# Patient Record
Sex: FEMALE | Race: WHITE | NOT HISPANIC OR LATINO | Employment: UNEMPLOYED | URBAN - NONMETROPOLITAN AREA
[De-identification: names, ages, dates, MRNs, and addresses within clinical notes are randomized per-mention and may not be internally consistent; named-entity substitution may affect disease eponyms.]

---

## 2017-12-01 DIAGNOSIS — I25.118 CORONARY ARTERY DISEASE INVOLVING NATIVE HEART WITH OTHER FORM OF ANGINA PECTORIS, UNSPECIFIED VESSEL OR LESION TYPE (HCC): Primary | ICD-10-CM

## 2017-12-01 PROBLEM — I25.10 CORONARY ARTERY DISEASE: Status: ACTIVE | Noted: 2017-12-01

## 2017-12-08 ENCOUNTER — HOSPITAL ENCOUNTER (OUTPATIENT)
Facility: HOSPITAL | Age: 47
Discharge: HOME | End: 2017-12-09
Attending: INTERNAL MEDICINE | Admitting: INTERNAL MEDICINE

## 2017-12-08 DIAGNOSIS — I25.118 CORONARY ARTERY DISEASE INVOLVING NATIVE HEART WITH OTHER FORM OF ANGINA PECTORIS, UNSPECIFIED VESSEL OR LESION TYPE (HCC): ICD-10-CM

## 2017-12-08 PROCEDURE — C1760 CLOSURE DEV, VASC: HCPCS | Performed by: INTERNAL MEDICINE

## 2017-12-08 PROCEDURE — 93010 ELECTROCARDIOGRAM REPORT: CPT | Performed by: INTERNAL MEDICINE

## 2017-12-08 PROCEDURE — 25010000002 HEPARIN (PORCINE) PER 1000 UNITS: Performed by: INTERNAL MEDICINE

## 2017-12-08 PROCEDURE — C1769 GUIDE WIRE: HCPCS | Performed by: INTERNAL MEDICINE

## 2017-12-08 PROCEDURE — 99153 MOD SED SAME PHYS/QHP EA: CPT | Performed by: INTERNAL MEDICINE

## 2017-12-08 PROCEDURE — 0 IOPAMIDOL PER 1 ML: Performed by: INTERNAL MEDICINE

## 2017-12-08 PROCEDURE — C1887 CATHETER, GUIDING: HCPCS | Performed by: INTERNAL MEDICINE

## 2017-12-08 PROCEDURE — 93571 IV DOP VEL&/PRESS C FLO 1ST: CPT | Performed by: INTERNAL MEDICINE

## 2017-12-08 PROCEDURE — L1830 KO IMMOB CANVAS LONG PRE OTS: HCPCS | Performed by: INTERNAL MEDICINE

## 2017-12-08 PROCEDURE — 93454 CORONARY ARTERY ANGIO S&I: CPT | Performed by: INTERNAL MEDICINE

## 2017-12-08 PROCEDURE — C1874 STENT, COATED/COV W/DEL SYS: HCPCS | Performed by: INTERNAL MEDICINE

## 2017-12-08 PROCEDURE — 99152 MOD SED SAME PHYS/QHP 5/>YRS: CPT | Performed by: INTERNAL MEDICINE

## 2017-12-08 PROCEDURE — 25010000002 BIVALIRUDIN 5 MG/ML: Performed by: INTERNAL MEDICINE

## 2017-12-08 PROCEDURE — 25010000002 DIPHENHYDRAMINE PER 50 MG: Performed by: INTERNAL MEDICINE

## 2017-12-08 PROCEDURE — S0260 H&P FOR SURGERY: HCPCS | Performed by: INTERNAL MEDICINE

## 2017-12-08 PROCEDURE — C9606 PERC D-E COR REVASC W AMI S: HCPCS | Performed by: INTERNAL MEDICINE

## 2017-12-08 PROCEDURE — 25010000002 FENTANYL CITRATE (PF) 100 MCG/2ML SOLUTION: Performed by: INTERNAL MEDICINE

## 2017-12-08 PROCEDURE — 94799 UNLISTED PULMONARY SVC/PX: CPT

## 2017-12-08 PROCEDURE — 25010000002 ADENOSINE (DIAGNOSTIC) PER 30 MG: Performed by: INTERNAL MEDICINE

## 2017-12-08 PROCEDURE — 25010000002 MIDAZOLAM PER 1 MG: Performed by: INTERNAL MEDICINE

## 2017-12-08 PROCEDURE — C1894 INTRO/SHEATH, NON-LASER: HCPCS | Performed by: INTERNAL MEDICINE

## 2017-12-08 PROCEDURE — 93005 ELECTROCARDIOGRAM TRACING: CPT | Performed by: INTERNAL MEDICINE

## 2017-12-08 PROCEDURE — 92928 PRQ TCAT PLMT NTRAC ST 1 LES: CPT | Performed by: INTERNAL MEDICINE

## 2017-12-08 DEVICE — XIENCE ALPINE EVEROLIMUS ELUTING CORONARY STENT SYSTEM 2.50 MM X 15 MM / RAPID-EXCHANGE
Type: IMPLANTABLE DEVICE | Status: FUNCTIONAL
Brand: XIENCE ALPINE

## 2017-12-08 RX ORDER — CLOPIDOGREL BISULFATE 75 MG/1
TABLET ORAL AS NEEDED
Status: DISCONTINUED | OUTPATIENT
Start: 2017-12-08 | End: 2017-12-08 | Stop reason: HOSPADM

## 2017-12-08 RX ORDER — FLUCONAZOLE 150 MG/1
150 TABLET ORAL DAILY PRN
COMMUNITY

## 2017-12-08 RX ORDER — SODIUM CHLORIDE 9 MG/ML
100 INJECTION, SOLUTION INTRAVENOUS CONTINUOUS
Status: DISCONTINUED | OUTPATIENT
Start: 2017-12-08 | End: 2017-12-09 | Stop reason: HOSPADM

## 2017-12-08 RX ORDER — FEXOFENADINE HCL AND PSEUDOEPHEDRINE HCI 180; 240 MG/1; MG/1
1 TABLET, EXTENDED RELEASE ORAL NIGHTLY
COMMUNITY
End: 2017-12-19 | Stop reason: HOSPADM

## 2017-12-08 RX ORDER — FENTANYL CITRATE 50 UG/ML
INJECTION, SOLUTION INTRAMUSCULAR; INTRAVENOUS AS NEEDED
Status: DISCONTINUED | OUTPATIENT
Start: 2017-12-08 | End: 2017-12-08 | Stop reason: HOSPADM

## 2017-12-08 RX ORDER — MIDAZOLAM HYDROCHLORIDE 1 MG/ML
INJECTION INTRAMUSCULAR; INTRAVENOUS AS NEEDED
Status: DISCONTINUED | OUTPATIENT
Start: 2017-12-08 | End: 2017-12-08 | Stop reason: HOSPADM

## 2017-12-08 RX ORDER — POTASSIUM CHLORIDE 750 MG/1
10 CAPSULE, EXTENDED RELEASE ORAL DAILY
Status: DISCONTINUED | OUTPATIENT
Start: 2017-12-08 | End: 2017-12-09 | Stop reason: HOSPADM

## 2017-12-08 RX ORDER — SODIUM CHLORIDE 0.9 % (FLUSH) 0.9 %
1-10 SYRINGE (ML) INJECTION AS NEEDED
Status: DISCONTINUED | OUTPATIENT
Start: 2017-12-08 | End: 2017-12-08 | Stop reason: HOSPADM

## 2017-12-08 RX ORDER — ATORVASTATIN CALCIUM 10 MG/1
20 TABLET, FILM COATED ORAL NIGHTLY
Status: DISCONTINUED | OUTPATIENT
Start: 2017-12-08 | End: 2017-12-09

## 2017-12-08 RX ORDER — LIDOCAINE HYDROCHLORIDE 20 MG/ML
INJECTION, SOLUTION INFILTRATION; PERINEURAL AS NEEDED
Status: DISCONTINUED | OUTPATIENT
Start: 2017-12-08 | End: 2017-12-08 | Stop reason: HOSPADM

## 2017-12-08 RX ORDER — CYCLOBENZAPRINE HCL 10 MG
10 TABLET ORAL 3 TIMES DAILY PRN
COMMUNITY

## 2017-12-08 RX ORDER — ATORVASTATIN CALCIUM 20 MG/1
20 TABLET, FILM COATED ORAL DAILY
COMMUNITY
End: 2017-12-09 | Stop reason: HOSPADM

## 2017-12-08 RX ORDER — CYCLOBENZAPRINE HCL 10 MG
10 TABLET ORAL 3 TIMES DAILY PRN
Status: DISCONTINUED | OUTPATIENT
Start: 2017-12-08 | End: 2017-12-09 | Stop reason: HOSPADM

## 2017-12-08 RX ORDER — AMLODIPINE BESYLATE 2.5 MG/1
2.5 TABLET ORAL NIGHTLY
COMMUNITY

## 2017-12-08 RX ORDER — SODIUM CHLORIDE 9 MG/ML
50 INJECTION, SOLUTION INTRAVENOUS CONTINUOUS
Status: DISCONTINUED | OUTPATIENT
Start: 2017-12-08 | End: 2017-12-09 | Stop reason: HOSPADM

## 2017-12-08 RX ORDER — SODIUM CHLORIDE 9 MG/ML
1-3 INJECTION, SOLUTION INTRAVENOUS CONTINUOUS
Status: DISCONTINUED | OUTPATIENT
Start: 2017-12-08 | End: 2017-12-08

## 2017-12-08 RX ORDER — PREGABALIN 150 MG/1
150 CAPSULE ORAL 3 TIMES DAILY
COMMUNITY

## 2017-12-08 RX ORDER — LISINOPRIL 10 MG/1
10 TABLET ORAL DAILY
COMMUNITY
End: 2017-12-09 | Stop reason: HOSPADM

## 2017-12-08 RX ORDER — CLOPIDOGREL BISULFATE 75 MG/1
75 TABLET ORAL DAILY
Status: DISCONTINUED | OUTPATIENT
Start: 2017-12-09 | End: 2017-12-09 | Stop reason: HOSPADM

## 2017-12-08 RX ORDER — ASPIRIN 81 MG/1
81 TABLET, CHEWABLE ORAL NIGHTLY
COMMUNITY

## 2017-12-08 RX ORDER — GLYCOPYRROLATE 1 MG/1
1 TABLET ORAL 4 TIMES DAILY
COMMUNITY

## 2017-12-08 RX ORDER — ASPIRIN 325 MG
325 TABLET ORAL DAILY
Status: DISCONTINUED | OUTPATIENT
Start: 2017-12-08 | End: 2017-12-08 | Stop reason: HOSPADM

## 2017-12-08 RX ORDER — ASPIRIN 81 MG/1
81 TABLET, CHEWABLE ORAL DAILY
Status: DISCONTINUED | OUTPATIENT
Start: 2017-12-08 | End: 2017-12-09 | Stop reason: HOSPADM

## 2017-12-08 RX ORDER — POTASSIUM CHLORIDE 600 MG/1
20 TABLET, FILM COATED, EXTENDED RELEASE ORAL DAILY PRN
Status: ON HOLD | COMMUNITY
End: 2017-12-17

## 2017-12-08 RX ORDER — CLOPIDOGREL BISULFATE 75 MG/1
75 TABLET ORAL DAILY
COMMUNITY
End: 2017-12-19 | Stop reason: HOSPADM

## 2017-12-08 RX ORDER — ALBUTEROL SULFATE 90 UG/1
2 AEROSOL, METERED RESPIRATORY (INHALATION) EVERY 4 HOURS PRN
COMMUNITY

## 2017-12-08 RX ORDER — PREGABALIN 100 MG/1
150 CAPSULE ORAL 2 TIMES DAILY
Status: ON HOLD | COMMUNITY
End: 2017-12-08

## 2017-12-08 RX ORDER — LORAZEPAM 0.5 MG/1
0.5 TABLET ORAL 2 TIMES DAILY PRN
Status: DISCONTINUED | OUTPATIENT
Start: 2017-12-08 | End: 2017-12-09 | Stop reason: HOSPADM

## 2017-12-08 RX ORDER — LORAZEPAM 0.5 MG/1
0.5 TABLET ORAL 2 TIMES DAILY PRN
COMMUNITY

## 2017-12-08 RX ORDER — DIPHENHYDRAMINE HYDROCHLORIDE 50 MG/ML
INJECTION INTRAMUSCULAR; INTRAVENOUS AS NEEDED
Status: DISCONTINUED | OUTPATIENT
Start: 2017-12-08 | End: 2017-12-08 | Stop reason: HOSPADM

## 2017-12-08 RX ORDER — DICLOFENAC SODIUM AND MISOPROSTOL 75; 200 MG/1; UG/1
1 TABLET, DELAYED RELEASE ORAL 2 TIMES DAILY
Status: ON HOLD | COMMUNITY
End: 2017-12-08

## 2017-12-08 RX ORDER — LISINOPRIL 10 MG/1
10 TABLET ORAL DAILY
Status: DISCONTINUED | OUTPATIENT
Start: 2017-12-08 | End: 2017-12-09

## 2017-12-08 RX ORDER — BUMETANIDE 1 MG/1
1 TABLET ORAL DAILY PRN
COMMUNITY

## 2017-12-08 RX ORDER — GLYCOPYRROLATE 1 MG/1
1 TABLET ORAL 4 TIMES DAILY
Status: DISCONTINUED | OUTPATIENT
Start: 2017-12-08 | End: 2017-12-09 | Stop reason: HOSPADM

## 2017-12-08 RX ORDER — DICLOFENAC SODIUM 75 MG/1
75 TABLET, DELAYED RELEASE ORAL 2 TIMES DAILY
COMMUNITY

## 2017-12-08 RX ORDER — OMEPRAZOLE 20 MG/1
20 CAPSULE, DELAYED RELEASE ORAL DAILY
COMMUNITY

## 2017-12-08 RX ORDER — AMLODIPINE BESYLATE 5 MG/1
2.5 TABLET ORAL DAILY
Status: DISCONTINUED | OUTPATIENT
Start: 2017-12-08 | End: 2017-12-09

## 2017-12-08 RX ORDER — PREGABALIN 75 MG/1
150 CAPSULE ORAL EVERY 12 HOURS SCHEDULED
Status: DISCONTINUED | OUTPATIENT
Start: 2017-12-08 | End: 2017-12-09 | Stop reason: HOSPADM

## 2017-12-08 RX ADMIN — SODIUM CHLORIDE 50 ML/HR: 9 INJECTION, SOLUTION INTRAVENOUS at 13:24

## 2017-12-08 RX ADMIN — ASPIRIN 325 MG: 325 TABLET, COATED ORAL at 13:24

## 2017-12-08 RX ADMIN — GLYCOPYRROLATE 1 MG: 1 TABLET ORAL at 17:47

## 2017-12-08 RX ADMIN — LISINOPRIL 10 MG: 10 TABLET ORAL at 17:47

## 2017-12-08 RX ADMIN — AMLODIPINE BESYLATE 2.5 MG: 5 TABLET ORAL at 17:48

## 2017-12-08 RX ADMIN — ATORVASTATIN CALCIUM 20 MG: 10 TABLET, FILM COATED ORAL at 20:12

## 2017-12-08 RX ADMIN — PREGABALIN 150 MG: 75 CAPSULE ORAL at 20:12

## 2017-12-08 RX ADMIN — GLYCOPYRROLATE 1 MG: 1 TABLET ORAL at 20:12

## 2017-12-08 NOTE — PLAN OF CARE
Problem: Patient Care Overview (Adult)  Goal: Plan of Care Review  Outcome: Ongoing (interventions implemented as appropriate)    12/08/17 1702   Coping/Psychosocial Response Interventions   Plan Of Care Reviewed With patient   Patient Care Overview   Progress no change   Outcome Evaluation   Outcome Summary/Follow up Plan Pt has just arrived to floor. Pt wants to get up and was educated about the need for an 8 hour bedrest per MD orders. Pt states she is agreeable. Will place patient on bedcheck to ensure compliance. R groin CDI. Soft, nontender. Pulses palpable. WIll continune to monitor.        Goal: Adult Individualization and Mutuality  Outcome: Ongoing (interventions implemented as appropriate)    12/08/17 1702   Mutuality/Individual Preferences   What Questions Do You Have About Your Health or Care? Why can't I sit up?   Individualization   Patient Specific Goals Wants something to eat   Patient Specific Interventions Laying flat, knee immobilizer on       Goal: Discharge Needs Assessment  Outcome: Ongoing (interventions implemented as appropriate)    12/08/17 1658 12/08/17 1702   Discharge Needs Assessment   Concerns To Be Addressed --  denies needs/concerns at this time   Readmission Within The Last 30 Days --  no previous admission in last 30 days   Equipment Needed After Discharge --  none   Discharge Disposition --  still a patient   Current Health   Anticipated Changes Related to Illness --  none   Living Environment   Transportation Available --  car;family or friend will provide   Self-Care   Equipment Currently Used at Home bipap/ cpap  (has cpap at home but pt states she does not use it) --          Problem: Cardiac Catheterization with/without PCI (Adult)  Goal: Signs and Symptoms of Listed Potential Problems Will be Absent or Manageable (Cardiac Catheterization with/without PCI)  Outcome: Ongoing (interventions implemented as appropriate)    12/08/17 1702   Cardiac Catheterization with/without PCI    Problems Assessed (Cardiac Catheterization) all   Problems Present (Cardiac Catheterization) none

## 2017-12-08 NOTE — H&P
LOS: 0 days   Patient Care Team:  Patrick Redd MD as PCP - General  John E Broadbent, MD as Referring Physician (Cardiology)  Clinton Reddy MD as Cardiologist (Cardiology)    Chief Complaint: chest pain and dyspnea on exertion      Chest pain with exertion, moderate substernal, pressure like. Lasts less than 5 minutes  No diaphoresis  No nausea  No radiation  Precipitated with exertion  Moderate associated dyspnea    Moderate exertional shortness of breath on exertion relieved with rest  No significant cough or wheezing  Going on for several months  No palpitations  associated chest pain  No significant pedal edema  No fever or chills  No significant expectoration  No hemoptysis  No presyncope or syncope       Patient Complaints:   Denies chest pain currently. Denies excessive shortness of breath. Denies abdominal pain, nausea vomiting or diarrhoea.    Telemetry: no malignant arrhythmia. No significant pauses.     Review of Systems   Constitutional: Negative for chills, fatigue and fever.   HENT: Negative.    Eyes: Negative.    Respiratory: Negative for cough, chest wall soreness  Moderate shortness of breath,   No wheezing  No stridor.    Cardiovascular:Positive for chest pain,   No palpitations and leg swelling.   Gastrointestinal: Negative for abdominal distention,   Mild abdominal pain,   No blood in stool, constipation, diarrhea, nausea and vomiting.   Endocrine: Negative.    Genitourinary: Negative for difficulty urinating, dysuria, flank pain and hematuria.   Musculoskeletal: Negative.    Skin: Negative for rash and wound.   Allergic/Immunologic: Negative.    Neurological: Negative for dizziness, syncope, weakness, light-headedness and headaches.   Hematological: Does not bruise/bleed easily.   Psychiatric/Behavioral: Negative for agitation, behavioral problems, confusion  The patient is not nervous/anxious.     Labs:    WBC No results found for: WBC   HGB No results found for: HGB   HCT No results  "found for: HCT   Platlets No results found for: PLT   MCV No results found for: MCV         Invalid input(s): LABALBU, PROTNo results found for: CKTOTAL, CKMB, CKMBINDEX, TROPONINI, TROPONINT  PT/INR:  No results found for: PROTIME/No results found for: INR    Imaging Results (last 72 hours)     ** No results found for the last 72 hours. **          Objective     Medication Review:   Current Facility-Administered Medications   Medication Dose Route Frequency Provider Last Rate Last Dose   • aspirin tablet 325 mg  325 mg Oral Daily Clinton Reddy MD   325 mg at 12/08/17 1324   • sodium chloride 0.9 % flush 1-10 mL  1-10 mL Intravenous PRN Clinton Reddy MD       • sodium chloride 0.9 % infusion  50 mL/hr Intravenous Continuous Clinton Reddy MD 50 mL/hr at 12/08/17 1324 50 mL/hr at 12/08/17 1324       Vital Sign Min/Max for last 24 hours  Temp  Min: 98.6 °F (37 °C)  Max: 98.6 °F (37 °C)   No Data Recorded   Pulse  Min: 89  Max: 89   Resp  Min: 18  Max: 18   SpO2  Min: 99 %  Max: 99 %   No Data Recorded   Weight  Min: 139 kg (307 lb)  Max: 139 kg (307 lb)     Flowsheet Rows         First Filed Value    Admission Height  165 cm (64.96\") Documented at 12/08/2017 1258    Admission Weight  (!)  139 kg (307 lb) Documented at 12/08/2017 1258                   Physical Exam:  Pupils equal and reactive    Ears ears appear intact with no abnormalities noted  Nose nares normal, septum midline, mucosa normal and no drainage  Neck supple, trachea midline, no thyromegaly, no carotid bruit and no JVD  Back no kyphosis present, no scoliosis present, no skin lesions, erythema, or scars, no tenderness to percussion or palpation and range of motion normal  Lungs respirations regular, respirations even and respirations unlabored  Heart normal S1, S2, no murmur, no francesco, no rub and no click  Abdomen soft  Skin no bleeding, bruising or rash     Results Review:   I reviewed the patient's new clinical results.  I reviewed the patient's new " imaging results and agree with the interpretation.  I reviewed the patient's other test results and agree with the interpretation  I personally viewed and interpreted the patient's EKG/Telemetry data  Discussed with patient    Social History     Social History   • Marital status: Single     Spouse name: N/A   • Number of children: N/A   • Years of education: N/A     Occupational History   • Not on file.     Social History Main Topics   • Smoking status: Current Every Day Smoker     Packs/day: 1.00     Types: Cigarettes   • Smokeless tobacco: Not on file   • Alcohol use No   • Drug use: Not on file   • Sexual activity: Defer     Other Topics Concern   • Not on file     Social History Narrative   • No narrative on file       History reviewed. No pertinent family history.    Allergies   Allergen Reactions   • Ciprofloxacin Nausea And Vomiting   • Hydrocodone Nausea Only   • Metoprolol Itching   • Quinolones Nausea And Vomiting   • Sulfa Antibiotics Nausea And Vomiting       Past Medical History:   Diagnosis Date   • Abnormal Pap smear of cervix    • Anxiety    • Arthritis    • Asthma    • CHF (congestive heart failure)    • Chlamydia    • COPD (chronic obstructive pulmonary disease)    • Epilepsy    • MRSA (methicillin resistant staph aureus) culture positive    • Seasonal allergies    • Sleep apnea    • Stroke    • Wrist fracture        Past Surgical History:   Procedure Laterality Date   • CARDIAC CATHETERIZATION     • CAROTID ENDARTERECTOMY           Medication Review: Performed    Assessment/Plan     Principal Problem:    Coronary artery disease  Stenosis of Left circumflex artery  Essential Hypertension  Morbid obesity  Smoker  obstructive sleep apnea   Prior use coccaine     Plan  Recommend cardiac catheterization, selective coronary angiography, left ventriculography and percutaneous coronary intervention with application of arteriotomy hemostatic closure device.  I discussed cardiac catheterization, the  procedure, risks (including bleeding, infection, vascular damage [including minor oozing, bruising, bleeding, and up to and including but not limited to the need for vascular surgery, emergency cardiothoracic surgery, contrast reaction, renal failure, respiratory failure, heart attack, stroke, arrhythmia and even death), benefits, and alternatives and the patient has voiced understanding and is willing to proceed.  No contraindication to drug eluting stent placement if required  Further recommendations pending results of cardiac catheterization    Supportive care  Telemetry  Optimal medical therapy  Deep vein thrombosis prophylaxis/precautions  Appropriate diet, fluid, sodium, caffeine, stimulants intake   Compliance to diet and medications   Avoid NSAIDS    Clinton Reddy MD  12/08/17  2:06 PM

## 2017-12-09 VITALS
OXYGEN SATURATION: 100 % | RESPIRATION RATE: 18 BRPM | SYSTOLIC BLOOD PRESSURE: 119 MMHG | DIASTOLIC BLOOD PRESSURE: 64 MMHG | BODY MASS INDEX: 48.82 KG/M2 | WEIGHT: 293 LBS | HEART RATE: 87 BPM | HEIGHT: 65 IN | TEMPERATURE: 97.6 F

## 2017-12-09 PROBLEM — G47.33 OSA (OBSTRUCTIVE SLEEP APNEA): Status: ACTIVE | Noted: 2017-12-09

## 2017-12-09 PROBLEM — Z72.0 TOBACCO ABUSE: Status: ACTIVE | Noted: 2017-12-09

## 2017-12-09 PROBLEM — I10 ESSENTIAL HYPERTENSION: Status: ACTIVE | Noted: 2017-12-09

## 2017-12-09 PROBLEM — E78.2 MIXED HYPERLIPIDEMIA: Status: ACTIVE | Noted: 2017-12-09

## 2017-12-09 PROBLEM — I25.10 CORONARY ARTERY DISEASE: Status: ACTIVE | Noted: 2017-12-01

## 2017-12-09 PROBLEM — Z95.5 STATUS POST INSERTION OF DRUG ELUTING CORONARY ARTERY STENT: Status: ACTIVE | Noted: 2017-12-09

## 2017-12-09 PROBLEM — IMO0001 CLASS 3 OBESITY DUE TO EXCESS CALORIES WITH SERIOUS COMORBIDITY AND BODY MASS INDEX (BMI) OF 50.0 TO 59.9 IN ADULT: Status: ACTIVE | Noted: 2017-12-09

## 2017-12-09 LAB
ANION GAP SERPL CALCULATED.3IONS-SCNC: 9 MMOL/L (ref 4–13)
ARTICHOKE IGE QN: 125 MG/DL (ref 0–99)
BUN BLD-MCNC: 10 MG/DL (ref 5–21)
BUN/CREAT SERPL: 12.7 (ref 7–25)
CALCIUM SPEC-SCNC: 8.2 MG/DL (ref 8.4–10.4)
CHLORIDE SERPL-SCNC: 106 MMOL/L (ref 98–110)
CHOLEST SERPL-MCNC: 176 MG/DL (ref 130–200)
CO2 SERPL-SCNC: 24 MMOL/L (ref 24–31)
CREAT BLD-MCNC: 0.79 MG/DL (ref 0.5–1.4)
DEPRECATED RDW RBC AUTO: 44.7 FL (ref 40–54)
ERYTHROCYTE [DISTWIDTH] IN BLOOD BY AUTOMATED COUNT: 13.8 % (ref 12–15)
GFR SERPL CREATININE-BSD FRML MDRD: 78 ML/MIN/1.73
GLUCOSE BLD-MCNC: 120 MG/DL (ref 70–100)
HBA1C MFR BLD: 6.1 %
HCT VFR BLD AUTO: 38.4 % (ref 37–47)
HDLC SERPL-MCNC: 31 MG/DL
HGB BLD-MCNC: 12.2 G/DL (ref 12–16)
LDLC/HDLC SERPL: 3.12 {RATIO}
MCH RBC QN AUTO: 28 PG (ref 28–32)
MCHC RBC AUTO-ENTMCNC: 31.8 G/DL (ref 33–36)
MCV RBC AUTO: 88.3 FL (ref 82–98)
PLATELET # BLD AUTO: 520 10*3/MM3 (ref 130–400)
PMV BLD AUTO: 9.5 FL (ref 6–12)
POTASSIUM BLD-SCNC: 4.2 MMOL/L (ref 3.5–5.3)
RBC # BLD AUTO: 4.35 10*6/MM3 (ref 4.2–5.4)
SODIUM BLD-SCNC: 139 MMOL/L (ref 135–145)
TRIGL SERPL-MCNC: 241 MG/DL (ref 0–149)
WBC NRBC COR # BLD: 11.43 10*3/MM3 (ref 4.8–10.8)

## 2017-12-09 PROCEDURE — 93010 ELECTROCARDIOGRAM REPORT: CPT | Performed by: INTERNAL MEDICINE

## 2017-12-09 PROCEDURE — 80061 LIPID PANEL: CPT | Performed by: INTERNAL MEDICINE

## 2017-12-09 PROCEDURE — 99217 PR OBSERVATION CARE DISCHARGE MANAGEMENT: CPT | Performed by: INTERNAL MEDICINE

## 2017-12-09 PROCEDURE — 85027 COMPLETE CBC AUTOMATED: CPT | Performed by: INTERNAL MEDICINE

## 2017-12-09 PROCEDURE — 83036 HEMOGLOBIN GLYCOSYLATED A1C: CPT | Performed by: INTERNAL MEDICINE

## 2017-12-09 PROCEDURE — 93005 ELECTROCARDIOGRAM TRACING: CPT | Performed by: INTERNAL MEDICINE

## 2017-12-09 PROCEDURE — 80048 BASIC METABOLIC PNL TOTAL CA: CPT | Performed by: INTERNAL MEDICINE

## 2017-12-09 RX ORDER — CARVEDILOL 3.12 MG/1
3.12 TABLET ORAL EVERY 12 HOURS SCHEDULED
Status: DISCONTINUED | OUTPATIENT
Start: 2017-12-09 | End: 2017-12-09 | Stop reason: HOSPADM

## 2017-12-09 RX ORDER — AMLODIPINE BESYLATE 5 MG/1
5 TABLET ORAL DAILY
Status: DISCONTINUED | OUTPATIENT
Start: 2017-12-10 | End: 2017-12-09 | Stop reason: HOSPADM

## 2017-12-09 RX ORDER — LISINOPRIL 20 MG/1
20 TABLET ORAL DAILY
Status: DISCONTINUED | OUTPATIENT
Start: 2017-12-10 | End: 2017-12-09 | Stop reason: HOSPADM

## 2017-12-09 RX ORDER — CARVEDILOL 3.12 MG/1
3.12 TABLET ORAL EVERY 12 HOURS SCHEDULED
Qty: 180 TABLET | Refills: 3 | Status: SHIPPED | OUTPATIENT
Start: 2017-12-09

## 2017-12-09 RX ORDER — LISINOPRIL 20 MG/1
20 TABLET ORAL DAILY
Qty: 90 TABLET | Refills: 3 | Status: SHIPPED | OUTPATIENT
Start: 2017-12-10

## 2017-12-09 RX ORDER — AMLODIPINE BESYLATE 5 MG/1
2.5 TABLET ORAL ONCE
Status: COMPLETED | OUTPATIENT
Start: 2017-12-09 | End: 2017-12-09

## 2017-12-09 RX ORDER — ATORVASTATIN CALCIUM 80 MG/1
80 TABLET, FILM COATED ORAL NIGHTLY
Qty: 90 TABLET | Refills: 3 | Status: SHIPPED | OUTPATIENT
Start: 2017-12-09

## 2017-12-09 RX ORDER — ATORVASTATIN CALCIUM 40 MG/1
80 TABLET, FILM COATED ORAL NIGHTLY
Status: DISCONTINUED | OUTPATIENT
Start: 2017-12-09 | End: 2017-12-09 | Stop reason: HOSPADM

## 2017-12-09 RX ORDER — LISINOPRIL 10 MG/1
10 TABLET ORAL ONCE
Status: COMPLETED | OUTPATIENT
Start: 2017-12-09 | End: 2017-12-09

## 2017-12-09 RX ADMIN — AMLODIPINE BESYLATE 2.5 MG: 5 TABLET ORAL at 08:30

## 2017-12-09 RX ADMIN — LISINOPRIL 10 MG: 10 TABLET ORAL at 12:27

## 2017-12-09 RX ADMIN — AMLODIPINE BESYLATE 2.5 MG: 5 TABLET ORAL at 12:27

## 2017-12-09 RX ADMIN — PREGABALIN 150 MG: 75 CAPSULE ORAL at 08:31

## 2017-12-09 RX ADMIN — LISINOPRIL 10 MG: 10 TABLET ORAL at 08:30

## 2017-12-09 RX ADMIN — GLYCOPYRROLATE 1 MG: 1 TABLET ORAL at 08:31

## 2017-12-09 RX ADMIN — ASPIRIN 81 MG 81 MG: 81 TABLET ORAL at 08:30

## 2017-12-09 RX ADMIN — POTASSIUM CHLORIDE 10 MEQ: 750 CAPSULE, EXTENDED RELEASE ORAL at 08:31

## 2017-12-09 RX ADMIN — CARVEDILOL 3.12 MG: 3.12 TABLET, FILM COATED ORAL at 14:35

## 2017-12-09 NOTE — DISCHARGE SUMMARY
[facility] HEART GROUP DISCHARGE    Date of Admission: 2017  Date of Discharge:  2017    Attending: Dr. Clinton Reddy    Discharge Diagnosis:   Principal Problem:    Coronary artery disease  Active Problems:    Status post insertion of drug eluting coronary artery stent    Mixed hyperlipidemia    Essential hypertension    TUSHAR (obstructive sleep apnea)    Class 3 obesity due to excess calories with serious comorbidity and body mass index (BMI) of 50.0 to 59.9 in adult    Tobacco abuse      Presenting Problem/History of Present Illness  Coronary artery disease involving native heart with other form of angina pectoris, unspecified vessel or lesion type [I25.118]    Hospital Course  Patient is a 47 y.o. female who presented to [facility] for an elective left heart catheterization, which revealed 70% stenosis of mid to distal left circumflex coronary artery treated with the primary stenting using a 2.5×15 mm Alpine drug eluting stent with 0% residual stenosis. The procedure was tolerated well without obvious complications. Overnight telemetry revealed PVCs with couplets and trigeminy. , triglycerides 241. Lipitor has been increased to 80 mg by mouth daily and lisinopril has been increased to 20 mg daily. She has been started on coreg 3.125 mg by mouth twice daily. She will continue on all other medications as previously prescribed including aspirin and plavix. She will follow up with her PCP in two weeks. She will follow up with Dr. Broadbent in 6 weeks. She will be referred to cardiac rehab to start in two weeks.     Procedures Performed  Procedure(s):  Coronary angiography  Stent NAYAN coronary  Functional Flow Reserve     Pertinent Test Results:     [facility] CATH LAB  [address] [place] [id number] [phone]             Patient Information   Patient Name MRN Sex  (Age)   Serene Cr [id number] Female 1970 (47 y.o.)   Race Ethnicity Encounter  Category   White or  Not  or  Elective   Procedures   Coronary angiography   Stent NAYAN coronary   Functional Flow Reserve    Performed Date   Dec 8, 2017      Physicians   Panel Physicians Referring Physician Case Authorizing Physician   Clinton Reddy MD (Primary)  Clinton Reddy MD   Indications   Coronary artery disease involving native heart with other form of angina pectoris, unspecified vessel or lesion type [I25.118 (ICD-10-CM)]       Conclusion   Cardiac Catheterization Operative Report     Serene Cr  [id number]  12/8/2017     Patient was referred for cardiac catheterization       Indications for the procedure include: Symptoms suggestive of angina with high suspicion for significant obstructive coronary artery disease    abnormal stress test  Cardiac catheterization performed by Dr. Broadbent in [facility] shows a severe stenosis of the mid to distal left circumflex coronary artery which is a codominant.  In addition there is stenosis of the right coronary artery.     Procedure performed  Primary stenting of the mid to distal left circumflex coronary artery using a 2.5×15 mm Alpine drug-eluting stent initial stenosis 70% postprocedure 0% EMMY-3 flow before and after procedure.  Fractional flow reserve of right coronary artery.    Coronary angiography  Right femoral arteriography  Insertion of 7 Fr Mynx hemostatic closure device with effective hemostasis and preserved right lower extremity pulses  Supervision of the administration of moderate sedation  Fractional flow reserve of right coronary artery.  Anticoagulation Angiomax bolus and infusion     Procedure Details  The risks, benefits, complications, treatment options, and expected outcomes were discussed with the patient. The patient and/or family concurred with the proposed plan, giving informed consent. Patient was brought to the cath lab after IV hydration was begun and oral premedication was given. He was further sedated  with fentanyl and midazolam.The patient was prepped and draped in the usual manner. Using the modified Seldinger access technique, a 6f Ugandan sheath was placed in the femoral artery.     Fractional flow reserve normal at above 0.95 of the right coronary artery.  Guide used AR 1 with sideholes  Usual protocol was followed.  Adenosine was used to the or hyperemia.  Guide used as mentioned above.  The guide was advanced and the ostium of the vessel was engaged.  We zeroed the status post aortic pressure then we advanced a fractional flow reserve wire.  This was equalized prior to crossing the lesion.  Then we crossed the lesion.  Anticoagulation was used prior to insertion of the wire.  After crossing the lesion adenosine was used.  This caused hyperemia.  Fractional flow reserve was then performed and documented as noted.  End of case the wire was removed removed angiography was performed before and after removing the wire to document no complication and decide results were achieved.      A left heart catheterization was performed.     Angiograms were also obtained.  Cardiac Catheterization Operative Report        Patient was referred for cardiac catheterization . Indications for the procedure include: abnormal stress test, chest pain, shortness of breath.      Procedure Details  The risks, benefits, complications, treatment options, and expected outcomes were discussed with the patient. The patient and/or family concurred with the proposed plan, giving informed consent. Patient was brought to the cath lab after IV hydration was begun and oral premedication was given.      The skin overlying the patient's right femoral artery was prepped and draped in the usual sterile fashion.  Timeout was taken to confirm the correct patient and procedure.  Lidocaine was administered for local anesthesia.  IV Versed and fentanyl were used to achieve conscious sedation.  Modified Seldinger technique was then used to place a 6 Ugandan  sheath in the right femoral artery     Diagnostic coronary angiography and intervention was performed with 7 Tajik XB 3.5 and AR1 coronary catheters.  Coronary angiogram were performed in Greek and HENDRIX projection to evaluate the coronary arterial systems.           Before all coronary angiograms and LV gram and Left heart pressure measurements were obtained, a femoral angiogram was performed and the arteriotomy was suitable for a closure device.  A 7Fr Mynx closure device was used to achieve hemostasis.  The patient tolerated the procedure well, and there were no immediate complications.     Procedural Details: After written and informed consent was obtained, the patient was brought to the cath lab in a fasting state.  Results:      Selective coronary angiography:     Left main coronary artery:  The left main coronary artery arises from the left coronary cusp and bifurcates into the  left anterior descending coronary artery  and left circumflex arteries.       Left main coronary artery is normal     Left anterior descending artery:  The  left anterior descending coronary artery   arises normally from the left main coronary artery and courses in the anterior interventricular groove and terminates at the apex.  No high-grade stenosis noted.     Left circumflex:  The left circumflex arises form the left man artery and supplies obtuse marginal branches mid to distal left circumflex coronary artery has a 70% stenosis this was treated with a 2.5×15 mm Alpine drug-eluting stent with 0% residual stenosis EMMY-3 flow before and after procedure.     Right coronary artery:  The RCA arises normally from the right coronary cusp and is dominant for the posterior circulation.  The RCA is codominant and  proximal to midportion has approximately 60% stenosis.  Fractional flow reserve was performed this is normal at above 0.95.      Left heart cath: Not performed      LV Gram not performed      Interventions: As described  "     Estimated Blood Loss:  Minimal      Complications:  None; patient tolerated the procedure well.      Disposition: Cardiovascular observation unit         Condition: stable               I supervised the administration of conscious sedation by nursing staff throughout the case.       First dose was given at          1424           and the end of my face-to-face encounter was at         1525          Hours.          During the case, continuous pulse oximetry, heart rate, blood pressure, and patient status were monitored.               Conclusion     70% stenosis of mid to distal left circumflex coronary artery treated with the primary stenting using a 2.5×15 mm Alpine drug eluting stent with 0% residual stenosis.  60% stenosis of proximal to mid right coronary artery with normal fractional flow reserve.          Condition on Discharge:  Stable    Physical Exam at Discharge  Patient Vitals for the past 24 hrs:   BP Temp Temp src Pulse Resp SpO2 Height   12/09/17 1300 119/64 - - 87 - 100 % -   12/09/17 0955 151/95 - - 89 - 97 % -   12/09/17 0700 131/76 97.6 °F (36.4 °C) Temporal Art 90 18 95 % -   12/08/17 1925 138/58 97.9 °F (36.6 °C) Tympanic 84 16 94 % -   12/08/17 1712 143/58 97.6 °F (36.4 °C) Temporal Art 83 18 96 % 165.1 cm (65\")   12/08/17 1630 154/84 - - 89 16 96 % -   12/08/17 1615 150/67 - - 84 16 97 % -   12/08/17 1600 128/76 - - 87 20 97 % -   12/08/17 1545 127/71 - - 84 18 98 % -     Telemetry: SR 75-92, PVCs, couplets and trigeminy    Physical Exam   Constitutional: She is oriented to person, place, and time. Vital signs are normal. She appears well-developed and well-nourished. No distress.   HENT:   Head: Normocephalic and atraumatic.   Right Ear: External ear normal.   Left Ear: External ear normal.   Nose: Nose normal.   Eyes: Conjunctivae are normal. Pupils are equal, round, and reactive to light. Right eye exhibits no discharge. Left eye exhibits no discharge.   Neck: Normal range of motion. " Neck supple. No JVD present. Carotid bruit is not present. No tracheal deviation present. No thyromegaly present.   Cardiovascular: Normal rate, regular rhythm, normal heart sounds, intact distal pulses and normal pulses.  PMI is not displaced.  Exam reveals no gallop and no friction rub.    No murmur heard.  Pulses:       Radial pulses are 2+ on the right side, and 2+ on the left side.        Dorsalis pedis pulses are 2+ on the right side, and 2+ on the left side.        Posterior tibial pulses are 2+ on the right side, and 2+ on the left side.   Pulmonary/Chest: Effort normal and breath sounds normal. No respiratory distress. She has no decreased breath sounds. She has no wheezes. She has no rhonchi. She has no rales. She exhibits no tenderness.   Abdominal: Soft. She exhibits no distension. There is no tenderness.   Musculoskeletal: Normal range of motion. She exhibits no edema, tenderness or deformity.   Neurological: She is alert and oriented to person, place, and time.   Skin: Skin is warm and dry. No rash noted. She is not diaphoretic. No erythema. No pallor.        Psychiatric: She has a normal mood and affect. Her behavior is normal. Judgment and thought content normal.   Vitals reviewed.      Discharge Disposition: Home      Discharge Medications   Serene Cr   Home Medication Instructions FABRIZIO:[id number]    Printed on:12/09/17 1430   Medication Information                      albuterol (PROVENTIL HFA;VENTOLIN HFA) 108 (90 Base) MCG/ACT inhaler  Inhale 2 puffs Every 4 (Four) Hours As Needed for Wheezing.             amLODIPine (NORVASC) 2.5 MG tablet  Take 2.5 mg by mouth Daily.             aspirin 81 MG chewable tablet  Chew 81 mg Daily.             atorvastatin (LIPITOR) 80 MG tablet  Take 1 tablet by mouth Every Night.             bumetanide (BUMEX) 1 MG tablet  Take 1 mg by mouth Daily As Needed (swelling).             carvedilol (COREG) 3.125 MG tablet  Take 1 tablet by mouth Every 12 (Twelve)  Hours.             clopidogrel (PLAVIX) 75 MG tablet  Take 75 mg by mouth Daily.             cyclobenzaprine (FLEXERIL) 10 MG tablet  Take 10 mg by mouth 3 (Three) Times a Day As Needed for Muscle Spasms.             diclofenac (VOLTAREN) 75 MG EC tablet  Take 75 mg by mouth 2 (Two) Times a Day.             fexofenadine-pseudoephedrine (ALLEGRA-D 24) 180-240 MG per 24 hr tablet  Take 1 tablet by mouth Daily.             fluconazole (DIFLUCAN) 150 MG tablet  Take 150 mg by mouth Daily.             glycopyrrolate (ROBINUL) 1 MG tablet  Take 1 mg by mouth 4 (Four) Times a Day.             lisinopril (PRINIVIL,ZESTRIL) 20 MG tablet  Take 1 tablet by mouth Daily.             LORazepam (ATIVAN) 0.5 MG tablet  Take 0.5 mg by mouth 2 (Two) Times a Day As Needed for Anxiety.             omeprazole (priLOSEC) 20 MG capsule  Take 20 mg by mouth Daily.             potassium chloride (KLOR-CON) 8 MEQ CR tablet  Take 20 mEq by mouth Daily As Needed (when bumex is used).             pregabalin (LYRICA) 150 MG capsule  Take 150 mg by mouth 3 (Three) Times a Day.                 Education: 1) No driving for 24 hours and no longer taking narcotics.   2) Return to school / work in 2 weeks.  3) May shower today. No tub bath or standing water for one week.   4) Do not lift / push / pull more then 10 lbs for one week.  5) Report any worsening symptoms.  6) Monitor cath site for signs of bleeding or infection: drainage, swelling, pain, redness, warmth or fever.   7) Report any signs of bleeding.     Discharge Diet:   Diet Instructions     Diet: Cardiac; Thin       Discharge Diet:  Cardiac   Fluid Consistency:  Thin                 Activity at Discharge:   Activity Instructions     Discharge Activity Restrictions       1) No driving for 24 hours and no longer taking narcotics.   2) Return to school / work in 2 weeks.  3) May shower today. No tub bath or standing water for one week.   4) Do not lift / push / pull more then 10 lbs for one  week.                 Follow-up Appointments  FU with PCP in 2 weeks.  FU with Dr. Broadbent in 6 weeks.     RAMONITA Estevez  12/09/17  2:30 PM    Time: 45 minutes

## 2017-12-09 NOTE — PLAN OF CARE
Problem: Patient Care Overview (Adult)  Goal: Plan of Care Review  Outcome: Ongoing (interventions implemented as appropriate)    12/09/17 0319   Coping/Psychosocial Response Interventions   Plan Of Care Reviewed With patient   Patient Care Overview   Progress improving   Outcome Evaluation   Outcome Summary/Follow up Plan VSS, no c/o pain. right groin c/d/i, PPP. possibly home today. safe guard off.       Goal: Adult Individualization and Mutuality  Outcome: Ongoing (interventions implemented as appropriate)  Goal: Discharge Needs Assessment  Outcome: Ongoing (interventions implemented as appropriate)    Problem: Cardiac Catheterization with/without PCI (Adult)  Goal: Signs and Symptoms of Listed Potential Problems Will be Absent or Manageable (Cardiac Catheterization with/without PCI)  Outcome: Ongoing (interventions implemented as appropriate)    12/08/17 1702   Cardiac Catheterization with/without PCI   Problems Assessed (Cardiac Catheterization) all   Problems Present (Cardiac Catheterization) none

## 2017-12-17 ENCOUNTER — APPOINTMENT (OUTPATIENT)
Dept: CT IMAGING | Facility: HOSPITAL | Age: 47
End: 2017-12-17

## 2017-12-17 ENCOUNTER — HOSPITAL ENCOUNTER (INPATIENT)
Facility: HOSPITAL | Age: 47
LOS: 2 days | Discharge: HOME | End: 2017-12-19
Attending: EMERGENCY MEDICINE | Admitting: INTERNAL MEDICINE

## 2017-12-17 DIAGNOSIS — R91.1 LUNG NODULE: ICD-10-CM

## 2017-12-17 DIAGNOSIS — I24.9 ACS (ACUTE CORONARY SYNDROME) (HCC): Primary | ICD-10-CM

## 2017-12-17 PROBLEM — R07.9 CHEST PAIN: Status: ACTIVE | Noted: 2017-12-17

## 2017-12-17 LAB
AMPHET+METHAMPHET UR QL: POSITIVE
APTT PPP: 45.4 SECONDS (ref 24.1–34.8)
ARTICHOKE IGE QN: 146 MG/DL (ref 0–99)
BARBITURATES UR QL SCN: NEGATIVE
BASOPHILS # BLD AUTO: 0.08 10*3/MM3 (ref 0–0.2)
BASOPHILS NFR BLD AUTO: 0.4 % (ref 0–2)
BENZODIAZ UR QL SCN: NEGATIVE
CANNABINOIDS SERPL QL: POSITIVE
CHOLEST SERPL-MCNC: 221 MG/DL (ref 130–200)
COCAINE UR QL: NEGATIVE
D DIMER PPP FEU-MCNC: 0.67 MG/L (FEU) (ref 0–0.5)
DEPRECATED RDW RBC AUTO: 43.4 FL (ref 40–54)
EOSINOPHIL # BLD AUTO: 0.22 10*3/MM3 (ref 0–0.7)
EOSINOPHIL NFR BLD AUTO: 1.1 % (ref 0–4)
ERYTHROCYTE [DISTWIDTH] IN BLOOD BY AUTOMATED COUNT: 13.6 % (ref 12–15)
HBA1C MFR BLD: 6.4 %
HCT VFR BLD AUTO: 34.2 % (ref 37–47)
HCT VFR BLD AUTO: 35.5 % (ref 37–47)
HDLC SERPL-MCNC: 42 MG/DL
HGB BLD-MCNC: 11.5 G/DL (ref 12–16)
HOLD SPECIMEN: NORMAL
IMM GRANULOCYTES # BLD: 0.08 10*3/MM3 (ref 0–0.03)
IMM GRANULOCYTES NFR BLD: 0.4 % (ref 0–5)
INR PPP: 1.05 (ref 0.91–1.09)
LDLC/HDLC SERPL: 3 {RATIO}
LYMPHOCYTES # BLD AUTO: 5.49 10*3/MM3 (ref 0.72–4.86)
LYMPHOCYTES NFR BLD AUTO: 26.9 % (ref 15–45)
MCH RBC QN AUTO: 28.1 PG (ref 28–32)
MCHC RBC AUTO-ENTMCNC: 32.4 G/DL (ref 33–36)
MCV RBC AUTO: 86.8 FL (ref 82–98)
METHADONE UR QL SCN: NEGATIVE
MONOCYTES # BLD AUTO: 1.89 10*3/MM3 (ref 0.19–1.3)
MONOCYTES NFR BLD AUTO: 9.3 % (ref 4–12)
NEUTROPHILS # BLD AUTO: 12.67 10*3/MM3 (ref 1.87–8.4)
NEUTROPHILS NFR BLD AUTO: 61.9 % (ref 39–78)
NRBC BLD MANUAL-RTO: 0 /100 WBC (ref 0–0)
OPIATES UR QL: POSITIVE
PA ADP PRP-ACNC: 271 PRU
PCP UR QL SCN: NEGATIVE
PLATELET # BLD AUTO: 421 10*3/MM3 (ref 130–400)
PLATELET # BLD AUTO: 453 10*3/MM3 (ref 130–400)
PMV BLD AUTO: 9.4 FL (ref 6–12)
PROTHROMBIN TIME: 14 SECONDS (ref 11.9–14.6)
RBC # BLD AUTO: 4.09 10*6/MM3 (ref 4.2–5.4)
TRIGL SERPL-MCNC: 265 MG/DL (ref 0–149)
TROPONIN I SERPL-MCNC: 1.22 NG/ML (ref 0–0.03)
TROPONIN I SERPL-MCNC: 26.2 NG/ML (ref 0–0.03)
WBC NRBC COR # BLD: 20.43 10*3/MM3 (ref 4.8–10.8)
WHOLE BLOOD HOLD SPECIMEN: NORMAL

## 2017-12-17 PROCEDURE — 84484 ASSAY OF TROPONIN QUANT: CPT | Performed by: PHYSICIAN ASSISTANT

## 2017-12-17 PROCEDURE — 25010000002 ENOXAPARIN PER 10 MG: Performed by: INTERNAL MEDICINE

## 2017-12-17 PROCEDURE — 85025 COMPLETE CBC W/AUTO DIFF WBC: CPT | Performed by: INTERNAL MEDICINE

## 2017-12-17 PROCEDURE — L1830 KO IMMOB CANVAS LONG PRE OTS: HCPCS | Performed by: INTERNAL MEDICINE

## 2017-12-17 PROCEDURE — C1757 CATH, THROMBECTOMY/EMBOLECT: HCPCS | Performed by: INTERNAL MEDICINE

## 2017-12-17 PROCEDURE — 02703ZZ DILATION OF CORONARY ARTERY, ONE ARTERY, PERCUTANEOUS APPROACH: ICD-10-PCS | Performed by: INTERNAL MEDICINE

## 2017-12-17 PROCEDURE — 71275 CT ANGIOGRAPHY CHEST: CPT

## 2017-12-17 PROCEDURE — 83036 HEMOGLOBIN GLYCOSYLATED A1C: CPT | Performed by: PHYSICIAN ASSISTANT

## 2017-12-17 PROCEDURE — 93458 L HRT ARTERY/VENTRICLE ANGIO: CPT | Performed by: INTERNAL MEDICINE

## 2017-12-17 PROCEDURE — 80307 DRUG TEST PRSMV CHEM ANLYZR: CPT | Performed by: PHYSICIAN ASSISTANT

## 2017-12-17 PROCEDURE — C1769 GUIDE WIRE: HCPCS | Performed by: INTERNAL MEDICINE

## 2017-12-17 PROCEDURE — 94799 UNLISTED PULMONARY SVC/PX: CPT

## 2017-12-17 PROCEDURE — 85730 THROMBOPLASTIN TIME PARTIAL: CPT | Performed by: INTERNAL MEDICINE

## 2017-12-17 PROCEDURE — 99223 1ST HOSP IP/OBS HIGH 75: CPT | Performed by: INTERNAL MEDICINE

## 2017-12-17 PROCEDURE — 99285 EMERGENCY DEPT VISIT HI MDM: CPT

## 2017-12-17 PROCEDURE — 25010000002 MIDAZOLAM PER 1 MG: Performed by: INTERNAL MEDICINE

## 2017-12-17 PROCEDURE — 25010000002 DIPHENHYDRAMINE PER 50 MG: Performed by: INTERNAL MEDICINE

## 2017-12-17 PROCEDURE — 93010 ELECTROCARDIOGRAM REPORT: CPT | Performed by: INTERNAL MEDICINE

## 2017-12-17 PROCEDURE — 85610 PROTHROMBIN TIME: CPT | Performed by: INTERNAL MEDICINE

## 2017-12-17 PROCEDURE — 92920 PRQ TRLUML C ANGIOP 1ART&/BR: CPT | Performed by: INTERNAL MEDICINE

## 2017-12-17 PROCEDURE — 85379 FIBRIN DEGRADATION QUANT: CPT | Performed by: EMERGENCY MEDICINE

## 2017-12-17 PROCEDURE — 4A023N7 MEASUREMENT OF CARDIAC SAMPLING AND PRESSURE, LEFT HEART, PERCUTANEOUS APPROACH: ICD-10-PCS | Performed by: INTERNAL MEDICINE

## 2017-12-17 PROCEDURE — C1887 CATHETER, GUIDING: HCPCS | Performed by: INTERNAL MEDICINE

## 2017-12-17 PROCEDURE — 99152 MOD SED SAME PHYS/QHP 5/>YRS: CPT | Performed by: INTERNAL MEDICINE

## 2017-12-17 PROCEDURE — C1725 CATH, TRANSLUMIN NON-LASER: HCPCS | Performed by: INTERNAL MEDICINE

## 2017-12-17 PROCEDURE — C1760 CLOSURE DEV, VASC: HCPCS | Performed by: INTERNAL MEDICINE

## 2017-12-17 PROCEDURE — 93005 ELECTROCARDIOGRAM TRACING: CPT | Performed by: INTERNAL MEDICINE

## 2017-12-17 PROCEDURE — B2111ZZ FLUOROSCOPY OF MULTIPLE CORONARY ARTERIES USING LOW OSMOLAR CONTRAST: ICD-10-PCS | Performed by: INTERNAL MEDICINE

## 2017-12-17 PROCEDURE — B2151ZZ FLUOROSCOPY OF LEFT HEART USING LOW OSMOLAR CONTRAST: ICD-10-PCS | Performed by: INTERNAL MEDICINE

## 2017-12-17 PROCEDURE — 80061 LIPID PANEL: CPT | Performed by: PHYSICIAN ASSISTANT

## 2017-12-17 PROCEDURE — 0 IOPAMIDOL PER 1 ML: Performed by: INTERNAL MEDICINE

## 2017-12-17 PROCEDURE — 92973 PRQ TRLUML C MCHN ASP THRMBC: CPT | Performed by: INTERNAL MEDICINE

## 2017-12-17 PROCEDURE — 25010000002 FENTANYL CITRATE (PF) 100 MCG/2ML SOLUTION: Performed by: INTERNAL MEDICINE

## 2017-12-17 PROCEDURE — 85576 BLOOD PLATELET AGGREGATION: CPT | Performed by: INTERNAL MEDICINE

## 2017-12-17 PROCEDURE — 25010000002 ENOXAPARIN PER 10 MG: Performed by: PHYSICIAN ASSISTANT

## 2017-12-17 PROCEDURE — C1894 INTRO/SHEATH, NON-LASER: HCPCS | Performed by: INTERNAL MEDICINE

## 2017-12-17 PROCEDURE — 0 IOPAMIDOL PER 1 ML: Performed by: EMERGENCY MEDICINE

## 2017-12-17 PROCEDURE — 84484 ASSAY OF TROPONIN QUANT: CPT | Performed by: EMERGENCY MEDICINE

## 2017-12-17 PROCEDURE — 93005 ELECTROCARDIOGRAM TRACING: CPT | Performed by: EMERGENCY MEDICINE

## 2017-12-17 RX ORDER — LIDOCAINE HYDROCHLORIDE 20 MG/ML
INJECTION, SOLUTION INFILTRATION; PERINEURAL AS NEEDED
Status: DISCONTINUED | OUTPATIENT
Start: 2017-12-17 | End: 2017-12-17 | Stop reason: HOSPADM

## 2017-12-17 RX ORDER — LORAZEPAM 0.5 MG/1
0.5 TABLET ORAL 2 TIMES DAILY PRN
Status: DISCONTINUED | OUTPATIENT
Start: 2017-12-17 | End: 2017-12-19 | Stop reason: HOSPADM

## 2017-12-17 RX ORDER — NICOTINE 21 MG/24HR
1 PATCH, TRANSDERMAL 24 HOURS TRANSDERMAL
Status: DISCONTINUED | OUTPATIENT
Start: 2017-12-17 | End: 2017-12-19 | Stop reason: HOSPADM

## 2017-12-17 RX ORDER — LISINOPRIL 20 MG/1
20 TABLET ORAL DAILY
Status: DISCONTINUED | OUTPATIENT
Start: 2017-12-17 | End: 2017-12-19 | Stop reason: HOSPADM

## 2017-12-17 RX ORDER — CLOPIDOGREL BISULFATE 75 MG/1
75 TABLET ORAL DAILY
Status: DISCONTINUED | OUTPATIENT
Start: 2017-12-17 | End: 2017-12-17

## 2017-12-17 RX ORDER — FLUCONAZOLE 150 MG/1
150 TABLET ORAL ONCE
Status: COMPLETED | OUTPATIENT
Start: 2017-12-17 | End: 2017-12-18

## 2017-12-17 RX ORDER — POTASSIUM CHLORIDE 20 MEQ/1
20 TABLET, EXTENDED RELEASE ORAL DAILY PRN
COMMUNITY

## 2017-12-17 RX ORDER — SODIUM CHLORIDE 9 MG/ML
100 INJECTION, SOLUTION INTRAVENOUS CONTINUOUS
Status: DISCONTINUED | OUTPATIENT
Start: 2017-12-17 | End: 2017-12-18

## 2017-12-17 RX ORDER — CYCLOBENZAPRINE HCL 10 MG
10 TABLET ORAL 3 TIMES DAILY PRN
Status: DISCONTINUED | OUTPATIENT
Start: 2017-12-17 | End: 2017-12-19 | Stop reason: HOSPADM

## 2017-12-17 RX ORDER — PREGABALIN 75 MG/1
150 CAPSULE ORAL 3 TIMES DAILY
Status: DISCONTINUED | OUTPATIENT
Start: 2017-12-17 | End: 2017-12-19 | Stop reason: HOSPADM

## 2017-12-17 RX ORDER — MIDAZOLAM HYDROCHLORIDE 1 MG/ML
INJECTION INTRAMUSCULAR; INTRAVENOUS AS NEEDED
Status: DISCONTINUED | OUTPATIENT
Start: 2017-12-17 | End: 2017-12-17 | Stop reason: HOSPADM

## 2017-12-17 RX ORDER — BUMETANIDE 1 MG/1
1 TABLET ORAL DAILY PRN
Status: DISCONTINUED | OUTPATIENT
Start: 2017-12-17 | End: 2017-12-19 | Stop reason: HOSPADM

## 2017-12-17 RX ORDER — SODIUM CHLORIDE 0.9 % (FLUSH) 0.9 %
1-10 SYRINGE (ML) INJECTION AS NEEDED
Status: DISCONTINUED | OUTPATIENT
Start: 2017-12-17 | End: 2017-12-19 | Stop reason: HOSPADM

## 2017-12-17 RX ORDER — ATORVASTATIN CALCIUM 40 MG/1
80 TABLET, FILM COATED ORAL NIGHTLY
Status: DISCONTINUED | OUTPATIENT
Start: 2017-12-17 | End: 2017-12-19 | Stop reason: HOSPADM

## 2017-12-17 RX ORDER — AMLODIPINE BESYLATE 5 MG/1
2.5 TABLET ORAL DAILY
Status: DISCONTINUED | OUTPATIENT
Start: 2017-12-17 | End: 2017-12-19 | Stop reason: HOSPADM

## 2017-12-17 RX ORDER — ALBUTEROL SULFATE 2.5 MG/3ML
2.5 SOLUTION RESPIRATORY (INHALATION) EVERY 4 HOURS PRN
Status: DISCONTINUED | OUTPATIENT
Start: 2017-12-17 | End: 2017-12-19 | Stop reason: HOSPADM

## 2017-12-17 RX ORDER — PANTOPRAZOLE SODIUM 40 MG/1
40 TABLET, DELAYED RELEASE ORAL
Status: DISCONTINUED | OUTPATIENT
Start: 2017-12-18 | End: 2017-12-19 | Stop reason: HOSPADM

## 2017-12-17 RX ORDER — DIPHENHYDRAMINE HYDROCHLORIDE 50 MG/ML
INJECTION INTRAMUSCULAR; INTRAVENOUS AS NEEDED
Status: DISCONTINUED | OUTPATIENT
Start: 2017-12-17 | End: 2017-12-17 | Stop reason: HOSPADM

## 2017-12-17 RX ORDER — ASPIRIN 81 MG/1
81 TABLET, CHEWABLE ORAL DAILY
Status: DISCONTINUED | OUTPATIENT
Start: 2017-12-17 | End: 2017-12-19 | Stop reason: HOSPADM

## 2017-12-17 RX ORDER — FENTANYL CITRATE 50 UG/ML
INJECTION, SOLUTION INTRAMUSCULAR; INTRAVENOUS AS NEEDED
Status: DISCONTINUED | OUTPATIENT
Start: 2017-12-17 | End: 2017-12-17 | Stop reason: HOSPADM

## 2017-12-17 RX ORDER — POTASSIUM CHLORIDE 750 MG/1
20 CAPSULE, EXTENDED RELEASE ORAL DAILY PRN
Status: DISCONTINUED | OUTPATIENT
Start: 2017-12-17 | End: 2017-12-19 | Stop reason: HOSPADM

## 2017-12-17 RX ORDER — ALBUTEROL SULFATE 90 UG/1
2 AEROSOL, METERED RESPIRATORY (INHALATION) EVERY 4 HOURS PRN
Status: DISCONTINUED | OUTPATIENT
Start: 2017-12-17 | End: 2017-12-17 | Stop reason: CLARIF

## 2017-12-17 RX ORDER — NITROGLYCERIN 5 MG/ML
INJECTION, SOLUTION INTRAVENOUS AS NEEDED
Status: DISCONTINUED | OUTPATIENT
Start: 2017-12-17 | End: 2017-12-17 | Stop reason: HOSPADM

## 2017-12-17 RX ORDER — GLYCOPYRROLATE 1 MG/1
1 TABLET ORAL 4 TIMES DAILY
Status: DISCONTINUED | OUTPATIENT
Start: 2017-12-17 | End: 2017-12-19 | Stop reason: HOSPADM

## 2017-12-17 RX ORDER — CARVEDILOL 3.12 MG/1
3.12 TABLET ORAL EVERY 12 HOURS SCHEDULED
Status: DISCONTINUED | OUTPATIENT
Start: 2017-12-17 | End: 2017-12-19 | Stop reason: HOSPADM

## 2017-12-17 RX ADMIN — SODIUM CHLORIDE 100 ML/HR: 9 INJECTION, SOLUTION INTRAVENOUS at 19:52

## 2017-12-17 RX ADMIN — GLYCOPYRROLATE 1 MG: 1 TABLET ORAL at 22:24

## 2017-12-17 RX ADMIN — CLOPIDOGREL BISULFATE 75 MG: 75 TABLET, FILM COATED ORAL at 17:01

## 2017-12-17 RX ADMIN — ENOXAPARIN SODIUM 120 MG: 120 INJECTION SUBCUTANEOUS at 16:53

## 2017-12-17 RX ADMIN — ASPIRIN 81 MG 81 MG: 81 TABLET ORAL at 17:02

## 2017-12-17 RX ADMIN — PREGABALIN 150 MG: 75 CAPSULE ORAL at 17:01

## 2017-12-17 RX ADMIN — GLYCOPYRROLATE 1 MG: 1 TABLET ORAL at 17:07

## 2017-12-17 RX ADMIN — SODIUM CHLORIDE 100 ML/HR: 9 INJECTION, SOLUTION INTRAVENOUS at 22:25

## 2017-12-17 RX ADMIN — LISINOPRIL 20 MG: 20 TABLET ORAL at 16:51

## 2017-12-17 RX ADMIN — AMLODIPINE BESYLATE 2.5 MG: 5 TABLET ORAL at 17:01

## 2017-12-17 RX ADMIN — PREGABALIN 150 MG: 75 CAPSULE ORAL at 22:24

## 2017-12-17 RX ADMIN — NICOTINE 1 PATCH: 14 PATCH, EXTENDED RELEASE TRANSDERMAL at 16:52

## 2017-12-17 RX ADMIN — CARVEDILOL 3.12 MG: 3.12 TABLET, FILM COATED ORAL at 22:24

## 2017-12-17 RX ADMIN — DESMOPRESSIN ACETATE 80 MG: 0.2 TABLET ORAL at 22:24

## 2017-12-17 RX ADMIN — LORAZEPAM 0.5 MG: 0.5 TABLET ORAL at 13:16

## 2017-12-17 RX ADMIN — IOPAMIDOL 150 ML: 755 INJECTION, SOLUTION INTRAVENOUS at 13:11

## 2017-12-17 NOTE — ED NOTES
Dr. Reddy returned call to ED. Dr. Reddy made aware of patients chest pain, rating a 3/10. Patient cleared to go to CCU with chest pain.     Kate Rendon RN  12/17/17 2849

## 2017-12-17 NOTE — PROGRESS NOTES
Discharge Planning Assessment  [facility]     Patient Name: Serene Cr  MRN: [id number]  Today's Date: 12/17/2017    Admit Date: 12/17/2017          Discharge Needs Assessment       12/17/17 1553    Living Environment    Lives With significant other    Home Accessibility no concerns    Stair Railings at Home none    Type of Financial/Environmental Concern none    Transportation Available car;family or friend will provide    Living Environment    Provides Primary Care For no one    Quality Of Family Relationships supportive    Able to Return to Prior Living Arrangements yes    Discharge Needs Assessment    Concerns To Be Addressed no discharge needs identified    Readmission Within The Last 30 Days no previous admission in last 30 days    Anticipated Changes Related to Illness none    Equipment Currently Used at Home none    Equipment Needed After Discharge none            Discharge Plan     None        Discharge Placement     No information found                Demographic Summary     None            Functional Status     None            Psychosocial     None            Abuse/Neglect     None            Legal     None            Substance Abuse     None            Patient Forms     None          ANNITA MartinezW

## 2017-12-17 NOTE — ED TRIAGE NOTES
Patient was transferred by [facility]. At [place] patient received the following medications: 4mg Morphine IVP at 0830, 25 mg Toporol PO at 0830, 80mg Lipitor PO at 0830, Lovenox 120 mg Sub Q at 0830, 4mg Zofran IVP at 0830. Patient self administered 4 nitro at home, with n pain relief, and took 324 of Aspirin. Patient denies any ED medications.

## 2017-12-17 NOTE — ED PROVIDER NOTES
Subjective   Patient is a 47 y.o. female presenting with chest pain.   Chest Pain   Pain location:  Substernal area  Pain quality: aching and burning    Pain radiates to:  Does not radiate  Onset quality:  Gradual  Timing:  Intermittent  Progression:  Worsening  Chronicity:  Recurrent  Context: not breathing, not drug use, not eating, not lifting, not movement, not raising an arm, not at rest and not trauma    Relieved by:  Nothing  Worsened by:  Nothing  Associated symptoms: cough and nausea    Associated symptoms: no abdominal pain, no AICD problem, no anorexia, no anxiety, no back pain, no dysphagia, no fatigue, no heartburn, no lower extremity edema, no near-syncope, no numbness, no orthopnea, no palpitations and no weakness    Risk factors: coronary artery disease, high cholesterol, hypertension, obesity and smoking    Risk factors: no aortic disease, no birth control, not male, no Marfan's syndrome and no surgery        Review of Systems   Constitutional: Negative for fatigue.   HENT: Negative for trouble swallowing.    Respiratory: Positive for cough.    Cardiovascular: Positive for chest pain. Negative for palpitations, orthopnea and near-syncope.   Gastrointestinal: Positive for nausea. Negative for abdominal pain, anorexia and heartburn.   Musculoskeletal: Negative for back pain.   Neurological: Negative for weakness and numbness.       Past Medical History:   Diagnosis Date   • Abnormal Pap smear of cervix    • Anxiety    • Arthritis    • Asthma    • CHF (congestive heart failure)    • Chlamydia    • COPD (chronic obstructive pulmonary disease)    • Epilepsy    • MRSA (methicillin resistant staph aureus) culture positive    • Seasonal allergies    • Sleep apnea    • Stroke    • Wrist fracture        Allergies   Allergen Reactions   • Ciprofloxacin Nausea And Vomiting   • Hydrocodone Nausea Only   • Metoprolol Itching   • Quinolones Nausea And Vomiting   • Sulfa Antibiotics Nausea And Vomiting       Past  Surgical History:   Procedure Laterality Date   • CARDIAC CATHETERIZATION     • CARDIAC CATHETERIZATION Bilateral 12/8/2017    Procedure: Coronary angiography;  Surgeon: Clinton Reddy MD;  Location:  [facility] CATH [facility]VASIVE LOC[facility];  Service:    • CARDIAC CATHETERIZATION Left 12/8/2017    Procedure: Stent NAYAN coronary;  Surgeon: Clinton Reddy MD;  Location:  [facility] CATH INVAS[facility];  Service:    • CARDIAC CATHETERIZATION Right 12/8/2017    Procedure: Functional Flow [place];  Surgeon: Clinton Reddy MD;  Location:  [facility] CATH INVAS[facility];  Service:    • CAROTID ENDARTERECTOMY         History reviewed. No pertinent family history.    Social History     Social History   • Marital status: Single     Spouse name: N/A   • Number of children: N/A   • Years of education: N/A     Social History Main Topics   • Smoking status: Current Every Day Smoker     Packs/day: 1.00     Types: Cigarettes   • Smokeless tobacco: None   • Alcohol use No   • Drug use: None   • Sexual activity: Defer     Other Topics Concern   • None     Social History Narrative           Objective   Physical Exam   Constitutional: She is oriented to person, place, and time. She appears well-developed and well-nourished.   HENT:   Head: Normocephalic.   Right Ear: External ear normal.   Eyes: Conjunctivae are normal. Pupils are equal, round, and reactive to light.   Neck: Normal range of motion. Neck supple.   Cardiovascular: Normal rate, regular rhythm, normal heart sounds and intact distal pulses.  PMI is not displaced.  Exam reveals no decreased pulses.    No murmur heard.  Pulmonary/Chest: Effort normal and breath sounds normal. No accessory muscle usage. No tachypnea. No respiratory distress. She has no decreased breath sounds. She has no wheezes. She has no rales. She exhibits no tenderness.   Abdominal: Soft. Bowel sounds are normal. There is no tenderness.   Musculoskeletal: Normal range of motion. She exhibits no edema or tenderness.   Lower  extremity exam bilaterally is unremarkable.  There is no right or left calf tenderness .  There is no palpable venous cord.  No obvious difference in the size of the legs.  No pitting edema.  The dorsalis pedis and posterior tibial femoral and popliteal pulses are palpable and +2 bilaterally.  Homans sign is negative       Vascular Status -  Her exam exhibits right foot vasculature normal. Her exam exhibits no right foot edema. Her exam exhibits left foot vasculature normal. Her exam exhibits no left foot edema.  Neurological: She is alert and oriented to person, place, and time. She has normal reflexes. No cranial nerve deficit. Coordination normal.   Skin: Skin is warm. No rash noted. No erythema.   Nursing note and vitals reviewed.      Procedures         ED Course  ED Course   Comment By Time   The left circumflex arises form the left man artery and supplies obtuse marginal branches mid to distal left circumflex coronary artery has a 70% stenosis this was treated with a 2.5×15 mm Alpine drug-eluting stent with 0% residual stenosis EMMY-3 flow before and after procedure.      Right coronary artery:  The RCA arises normally from the right coronary cusp and is dominant for the posterior circulation.  The RCA is codominant and  proximal to midportion has approximately 60% stenosis.  Fractional flow reserve was performed this is normal at above 0.95. 12/08 Josef Navarrete MD 12/17 9787   Case discussed with cardiology ct pending she has received lovenox will admi Josef Navarrete MD 12/17 1210                  [facility]    Final diagnoses:   ACS (acute coronary syndrome)   Lung nodule            Josef Navarrete MD  12/17/17 1211       Josef Navarrete MD  12/17/17 6199

## 2017-12-17 NOTE — H&P
[facility] HEART GROUP HISTORY AND PHYSICAL      Patient Care Team:  Patrick Redd MD as PCP - General  John E Broadbent, MD as Referring Physician (Cardiology)  Clinton Reddy MD as Cardiologist (Cardiology)    Chief complaint : chest pain     Subjective     Serene Cr is a 47 y.o. female  With history of coronary artery disease, hypertension, anxiety, obesity, tobacco abuse who presents to [facility] ED as a transfer from [facility] with complaints of substernal chest pain. The patient relates that she was sitting at home and began having chest pain around 2 am today. She describes this as substernal, squeezing, at worst 5/10. She describes some mild shortness of breath associated with this. She recently had stent placed to LCX and reports compliance with medications. The patient admits continued tobacco abuse. The patient tells me that since her cardiac cath she has had some lower extremity pain with ambulation, as well as a cold feeling to her foot. She states that both of her feet have been like this for awhile, but worsening on the right. She denies any excessive exertional dyspnea, leg swelling or similar. She appears very anxious on my exam and tells me she wants to take her ativan out of her purse.     Review of Systems  Review of Systems   Constitution: Negative for malaise/fatigue and weight gain.   Cardiovascular: Positive for chest pain and dyspnea on exertion. Negative for claudication, irregular heartbeat, leg swelling, near-syncope, orthopnea, palpitations, paroxysmal nocturnal dyspnea and syncope.   Respiratory: Negative for hemoptysis and shortness of breath.    Hematologic/Lymphatic: Negative for bleeding problem.   Skin: Negative for poor wound healing.   Musculoskeletal: Negative for myalgias.   Gastrointestinal: Negative for melena, nausea and vomiting.   Genitourinary: Negative for hematuria.   Neurological: Negative for focal weakness and light-headedness.   Psychiatric/Behavioral: Negative  for memory loss.   All other systems reviewed and are negative.       History  Past Medical History:   Diagnosis Date   • Abnormal Pap smear of cervix    • Anxiety    • Arthritis    • Asthma    • CHF (congestive heart failure)    • Chlamydia    • COPD (chronic obstructive pulmonary disease)    • Epilepsy    • MRSA (methicillin resistant staph aureus) culture positive    • Seasonal allergies    • Sleep apnea    • Stroke    • Wrist fracture      Past Surgical History:   Procedure Laterality Date   • CARDIAC CATHETERIZATION     • CARDIAC CATHETERIZATION Bilateral 12/8/2017    Procedure: Coronary angiography;  Surgeon: Clinton Reddy MD;  Location:  [facility] CATH INVASIVE LOCATION;  Service:    • CARDIAC CATHETERIZATION Left 12/8/2017    Procedure: Stent NAYAN coronary;  Surgeon: Clinton Reddy MD;  Location:  [facility] CATH INVAS[facility] LOC[facility];  Service:    • CARDIAC CATHETERIZATION Right 12/8/2017    Procedure: Functional Flow [place];  Surgeon: Clinton Reddy MD;  Location:  [facility] CATH INVAS[facility];  Service:    • CAROTID ENDARTERECTOMY       History reviewed. No pertinent family history.  Social History   Substance Use Topics   • Smoking status: Current Every Day Smoker     Packs/day: 1.00     Types: Cigarettes   • Smokeless tobacco: None   • Alcohol use No       Medications  Prior to Admission medications    Medication Sig Start Date End Date Taking? Authorizing Provider   albuterol (PROVENTIL HFA;VENTOLIN HFA) 108 (90 Base) MCG/ACT inhaler Inhale 2 puffs Every 4 (Four) Hours As Needed for Wheezing.    Historical Provider, MD   amLODIPine (NORVASC) 2.5 MG tablet Take 2.5 mg by mouth Daily.    Historical Provider, MD   aspirin 81 MG chewable tablet Chew 81 mg Daily.    Historical Provider, MD   atorvastatin (LIPITOR) 80 MG tablet Take 1 tablet by mouth Every Night. 12/9/17   RAMONITA Estevez   bumetanide (BUMEX) 1 MG tablet Take 1 mg by mouth Daily As Needed (swelling).    Historical Provider, MD   carvedilol (COREG) 3.125  MG tablet Take 1 tablet by mouth Every 12 (Twelve) Hours. 12/9/17   RAMONITA Estevez   clopidogrel (PLAVIX) 75 MG tablet Take 75 mg by mouth Daily.    Historical Provider, MD   cyclobenzaprine (FLEXERIL) 10 MG tablet Take 10 mg by mouth 3 (Three) Times a Day As Needed for Muscle Spasms.    Historical Provider, MD   diclofenac (VOLTAREN) 75 MG EC tablet Take 75 mg by mouth 2 (Two) Times a Day.    Historical Provider, MD   fexofenadine-pseudoephedrine (ALLEGRA-D 24) 180-240 MG per 24 hr tablet Take 1 tablet by mouth Daily.    Historical Provider, MD   fluconazole (DIFLUCAN) 150 MG tablet Take 150 mg by mouth Daily.    Historical Provider, MD   glycopyrrolate (ROBINUL) 1 MG tablet Take 1 mg by mouth 4 (Four) Times a Day.    Historical Provider, MD   lisinopril (PRINIVIL,ZESTRIL) 20 MG tablet Take 1 tablet by mouth Daily. 12/10/17   RAMONITA Estevez   LORazepam (ATIVAN) 0.5 MG tablet Take 0.5 mg by mouth 2 (Two) Times a Day As Needed for Anxiety.    Historical Provider, MD   omeprazole (priLOSEC) 20 MG capsule Take 20 mg by mouth Daily.    Historical Provider, MD   potassium chloride (KLOR-CON) 8 MEQ CR tablet Take 20 mEq by mouth Daily As Needed (when bumex is used).    Historical Provider, MD   pregabalin (LYRICA) 150 MG capsule Take 150 mg by mouth 3 (Three) Times a Day.    Historical Provider, MD       Current Facility-Administered Medications   Medication Dose Route Frequency Provider Last Rate Last Dose   • LORazepam (ATIVAN) tablet 0.5 mg  0.5 mg Oral BID PRN Clinton Reddy MD       • pregabalin (LYRICA) capsule 150 mg  150 mg Oral TID Clinton Reddy MD         Current Outpatient Prescriptions   Medication Sig Dispense Refill   • albuterol (PROVENTIL HFA;VENTOLIN HFA) 108 (90 Base) MCG/ACT inhaler Inhale 2 puffs Every 4 (Four) Hours As Needed for Wheezing.     • amLODIPine (NORVASC) 2.5 MG tablet Take 2.5 mg by mouth Daily.     • aspirin 81 MG chewable tablet Chew 81 mg Daily.     • atorvastatin (LIPITOR) 80  MG tablet Take 1 tablet by mouth Every Night. 90 tablet 3   • bumetanide (BUMEX) 1 MG tablet Take 1 mg by mouth Daily As Needed (swelling).     • carvedilol (COREG) 3.125 MG tablet Take 1 tablet by mouth Every 12 (Twelve) Hours. 180 tablet 3   • clopidogrel (PLAVIX) 75 MG tablet Take 75 mg by mouth Daily.     • cyclobenzaprine (FLEXERIL) 10 MG tablet Take 10 mg by mouth 3 (Three) Times a Day As Needed for Muscle Spasms.     • diclofenac (VOLTAREN) 75 MG EC tablet Take 75 mg by mouth 2 (Two) Times a Day.     • fexofenadine-pseudoephedrine (ALLEGRA-D 24) 180-240 MG per 24 hr tablet Take 1 tablet by mouth Daily.     • fluconazole (DIFLUCAN) 150 MG tablet Take 150 mg by mouth Daily.     • glycopyrrolate (ROBINUL) 1 MG tablet Take 1 mg by mouth 4 (Four) Times a Day.     • lisinopril (PRINIVIL,ZESTRIL) 20 MG tablet Take 1 tablet by mouth Daily. 90 tablet 3   • LORazepam (ATIVAN) 0.5 MG tablet Take 0.5 mg by mouth 2 (Two) Times a Day As Needed for Anxiety.     • omeprazole (priLOSEC) 20 MG capsule Take 20 mg by mouth Daily.     • potassium chloride (KLOR-CON) 8 MEQ CR tablet Take 20 mEq by mouth Daily As Needed (when bumex is used).     • pregabalin (LYRICA) 150 MG capsule Take 150 mg by mouth 3 (Three) Times a Day.          Allergies:  Ciprofloxacin; Hydrocodone; Metoprolol; Quinolones; and Sulfa antibiotics    Objective     Vital Signs  Temp:  [96.9 °F (36.1 °C)] 96.9 °F (36.1 °C)  Heart Rate:  [89] 89  Resp:  [16] 16  BP: (165)/(72) 165/72    Labs  Lab Results (last 72 hours)     Procedure Component Value Units Date/Time    D-dimer, Quantitative [[id number]]  (Abnormal) Collected:  12/17/17 0955    Specimen:  Blood Updated:  12/17/17 1022     D-Dimer, Quantitative 0.67 (H) mg/L (FEU)     Narrative:       Reference Range is 0-0.50 mg/L FEU. However, results <0.50 mg/L FEU tends to rule out DVT or PE. Results >0.50 mg/L FEU are not useful in predicting absence or presence of DVT or PE.    [place] Draw [[id number]]  Collected:  12/17/17 0955    Specimen:  Blood Updated:  12/17/17 1101    Narrative:       The following orders were created for panel order [place] Draw.  Procedure                               Abnormality         Status                     ---------                               -----------         ------                     Lavender Top[[id number]]                                     Final result               Red Top[[id number]]                                          Final result                 Please view results for these tests on the individual orders.    L[place]ender Top [[id number]] Collected:  12/17/17 0955    Specimen:  Blood Updated:  12/17/17 1101     Extra Tube hold for add-on      Auto resulted       Red Top [[id number]] Collected:  12/17/17 0955    Specimen:  Blood Updated:  12/17/17 1101     Extra Tube Hold for add-ons.      Auto resulted.       Troponin [[id number]]  (Abnormal) Collected:  12/17/17 0955    Specimen:  Blood Updated:  12/17/17 1114     Troponin I 1.220 (C) ng/mL           Physical Exam:  Physical Exam   Constitutional: She is oriented to person, place, and time. She appears well-developed and well-nourished.   Morbid obesity    HENT:   Head: Normocephalic and atraumatic.   Eyes: Conjunctivae and EOM are normal. Pupils are equal, round, and reactive to light.   Neck: Normal range of motion. Neck supple. No JVD present.   Cardiovascular: Normal rate, regular rhythm, S1 normal, S2 normal, normal heart sounds and intact distal pulses.    No murmur heard.  Pulses:       Dorsalis pedis pulses are 0 on the right side, and 1+ on the left side.        Posterior tibial pulses are 0 on the right side, and 1+ on the left side.   Pulmonary/Chest: Effort normal and breath sounds normal. No respiratory distress.   Abdominal: Soft. Bowel sounds are normal. She exhibits no distension.   Musculoskeletal: She exhibits no edema.   Neurological: She is alert and oriented to person, place, and time.   Skin:  Skin is warm and dry.   Psychiatric: She has a normal mood and affect. Judgment normal.   Vitals reviewed.      Results Review:    I reviewed the patient's new clinical results.  I reviewed the patient's new imaging results and agree with the interpretation.  I reviewed the patient's other test results and agree with the interpretation  I personally viewed and interpreted the patient's EKG/Telemetry data    Assessment   1. Non ST elevation myocardial infarction with chest pain now resolved  2. Absent DP/PT pulses of right lower extremity    3. Coronary artery disease: recent stent placement to LCX on 12/8/17  4. Tobacco abuse: 1 ppd  5. Hypertension  6. Fibromyalgia per patient report  7. Panic disorder and agoraphobia per patient report  8. Marijuana use    Plan   1. Admit to CCU under care of Dr. Reddy  2. Continue trending troponin. If continues to rise, especially with symptoms, patient will likely need cardiac cath tomorrow.   3. CTA chest given elevated d dimer and patient complaints  4. STAT arterial ultrasound bilaterally, venous doppler bilaterally  5. Echo  6. Urine drug screen   7. Continue home meds, including aspirin and plavix. Therapeutic lovenox for now.   8. Counseled on tobacco abuse cessation     I discussed the patients findings and my recommendations with patient and consulting provider.     Nona Fuentes PA-C  12/17/17  12:42 PM      Please note this cardiology history and physical note is the result of a face to face consultation with the patient, in addition to reviewing medical records at length by myself, Nona Fuentes PA-C.    Time: appx 40 minutes

## 2017-12-17 NOTE — ED NOTES
Patient is currently experiencing chest pain at a 3/10. Dr. Maureen english for orders.     Kate Rendon RN  12/17/17 5692

## 2017-12-17 NOTE — PLAN OF CARE
Problem: Patient Care Overview (Adult)  Goal: Plan of Care Review  Outcome: Ongoing (interventions implemented as appropriate)  Goal: Discharge Needs Assessment  Outcome: Ongoing (interventions implemented as appropriate)    Problem: Acute Coronary Syndrome (ACS) (Adult)  Goal: Signs and Symptoms of Listed Potential Problems Will be Absent or Manageable (Acute Coronary Syndrome)  Outcome: Ongoing (interventions implemented as appropriate)    Problem: Fall Risk (Adult)  Goal: Identify Related Risk Factors and Signs and Symptoms  Outcome: Ongoing (interventions implemented as appropriate)  Goal: Absence of Falls  Outcome: Ongoing (interventions implemented as appropriate)    Problem: Pain, Chronic (Adult)  Goal: Identify Related Risk Factors and Signs and Symptoms  Outcome: Ongoing (interventions implemented as appropriate)  Goal: Acceptable Pain Control/Comfort Level  Outcome: Ongoing (interventions implemented as appropriate)    Problem: SUBSTANCE USE/ABUSE  Goal: By day 5 will complete medical detox and be discharged with an appropriate treatment plan in place.  Outcome: Ongoing (interventions implemented as appropriate)  Smoking cessation  Goal: By discharge, will develop insight into their chemical dependency and sustain motivation to continue in recovery.  Outcome: Ongoing (interventions implemented as appropriate)  Goal: By discharge, will have in place an ongoing treatment plan in collaboration with assigned CDP.  Outcome: Ongoing (interventions implemented as appropriate)

## 2017-12-18 ENCOUNTER — APPOINTMENT (OUTPATIENT)
Dept: CARDIOLOGY | Facility: HOSPITAL | Age: 47
End: 2017-12-18
Attending: INTERNAL MEDICINE

## 2017-12-18 ENCOUNTER — APPOINTMENT (OUTPATIENT)
Dept: ULTRASOUND IMAGING | Facility: HOSPITAL | Age: 47
End: 2017-12-18

## 2017-12-18 PROBLEM — F19.90 ILLICIT DRUG USE: Status: ACTIVE | Noted: 2017-12-18

## 2017-12-18 PROBLEM — T82.855A CORONARY STENT RESTENOSIS: Status: ACTIVE | Noted: 2017-12-18

## 2017-12-18 LAB
ANION GAP SERPL CALCULATED.3IONS-SCNC: 7 MMOL/L (ref 4–13)
ARTICHOKE IGE QN: 103 MG/DL (ref 0–99)
BUN BLD-MCNC: 16 MG/DL (ref 5–21)
BUN/CREAT SERPL: 17.4 (ref 7–25)
CALCIUM SPEC-SCNC: 8.4 MG/DL (ref 8.4–10.4)
CHLORIDE SERPL-SCNC: 105 MMOL/L (ref 98–110)
CHOLEST SERPL-MCNC: 160 MG/DL (ref 130–200)
CO2 SERPL-SCNC: 27 MMOL/L (ref 24–31)
CREAT BLD-MCNC: 0.92 MG/DL (ref 0.5–1.4)
DEPRECATED RDW RBC AUTO: 43.3 FL (ref 40–54)
ERYTHROCYTE [DISTWIDTH] IN BLOOD BY AUTOMATED COUNT: 13.7 % (ref 12–15)
GFR SERPL CREATININE-BSD FRML MDRD: 66 ML/MIN/1.73
GLUCOSE BLD-MCNC: 100 MG/DL (ref 70–100)
HCT VFR BLD AUTO: 35.7 % (ref 37–47)
HDLC SERPL-MCNC: 28 MG/DL
HGB BLD-MCNC: 11.6 G/DL (ref 12–16)
LDLC/HDLC SERPL: 2.16 {RATIO}
MCH RBC QN AUTO: 28.2 PG (ref 28–32)
MCHC RBC AUTO-ENTMCNC: 32.5 G/DL (ref 33–36)
MCV RBC AUTO: 86.7 FL (ref 82–98)
PLATELET # BLD AUTO: 431 10*3/MM3 (ref 130–400)
PMV BLD AUTO: 9.1 FL (ref 6–12)
POTASSIUM BLD-SCNC: 3.7 MMOL/L (ref 3.5–5.3)
RBC # BLD AUTO: 4.12 10*6/MM3 (ref 4.2–5.4)
SODIUM BLD-SCNC: 139 MMOL/L (ref 135–145)
TRIGL SERPL-MCNC: 358 MG/DL (ref 0–149)
TROPONIN I SERPL-MCNC: 62.6 NG/ML (ref 0–0.03)
TROPONIN I SERPL-MCNC: 92 NG/ML (ref 0–0.03)
WBC NRBC COR # BLD: 16.3 10*3/MM3 (ref 4.8–10.8)

## 2017-12-18 PROCEDURE — 84484 ASSAY OF TROPONIN QUANT: CPT | Performed by: PHYSICIAN ASSISTANT

## 2017-12-18 PROCEDURE — 85027 COMPLETE CBC AUTOMATED: CPT | Performed by: PHYSICIAN ASSISTANT

## 2017-12-18 PROCEDURE — 25010000002 ENOXAPARIN PER 10 MG: Performed by: INTERNAL MEDICINE

## 2017-12-18 PROCEDURE — 93970 EXTREMITY STUDY: CPT

## 2017-12-18 PROCEDURE — 93925 LOWER EXTREMITY STUDY: CPT | Performed by: SURGERY

## 2017-12-18 PROCEDURE — 99233 SBSQ HOSP IP/OBS HIGH 50: CPT | Performed by: INTERNAL MEDICINE

## 2017-12-18 PROCEDURE — 93306 TTE W/DOPPLER COMPLETE: CPT | Performed by: INTERNAL MEDICINE

## 2017-12-18 PROCEDURE — 80061 LIPID PANEL: CPT | Performed by: INTERNAL MEDICINE

## 2017-12-18 PROCEDURE — 93306 TTE W/DOPPLER COMPLETE: CPT

## 2017-12-18 PROCEDURE — 93925 LOWER EXTREMITY STUDY: CPT

## 2017-12-18 PROCEDURE — 80048 BASIC METABOLIC PNL TOTAL CA: CPT | Performed by: PHYSICIAN ASSISTANT

## 2017-12-18 PROCEDURE — 93970 EXTREMITY STUDY: CPT | Performed by: SURGERY

## 2017-12-18 RX ADMIN — PANTOPRAZOLE SODIUM 40 MG: 40 TABLET, DELAYED RELEASE ORAL at 06:06

## 2017-12-18 RX ADMIN — AMLODIPINE BESYLATE 2.5 MG: 5 TABLET ORAL at 09:27

## 2017-12-18 RX ADMIN — CARVEDILOL 3.12 MG: 3.12 TABLET, FILM COATED ORAL at 20:57

## 2017-12-18 RX ADMIN — SODIUM CHLORIDE 100 ML/HR: 9 INJECTION, SOLUTION INTRAVENOUS at 09:34

## 2017-12-18 RX ADMIN — FLUCONAZOLE 150 MG: 150 TABLET ORAL at 00:13

## 2017-12-18 RX ADMIN — SODIUM CHLORIDE 100 ML/HR: 9 INJECTION, SOLUTION INTRAVENOUS at 21:11

## 2017-12-18 RX ADMIN — GLYCOPYRROLATE 1 MG: 1 TABLET ORAL at 17:05

## 2017-12-18 RX ADMIN — TICAGRELOR 90 MG: 90 TABLET ORAL at 17:05

## 2017-12-18 RX ADMIN — ASPIRIN 81 MG 81 MG: 81 TABLET ORAL at 09:27

## 2017-12-18 RX ADMIN — DESMOPRESSIN ACETATE 80 MG: 0.2 TABLET ORAL at 20:57

## 2017-12-18 RX ADMIN — GLYCOPYRROLATE 1 MG: 1 TABLET ORAL at 09:27

## 2017-12-18 RX ADMIN — PREGABALIN 150 MG: 75 CAPSULE ORAL at 17:05

## 2017-12-18 RX ADMIN — TICAGRELOR 90 MG: 90 TABLET ORAL at 09:29

## 2017-12-18 RX ADMIN — PREGABALIN 150 MG: 75 CAPSULE ORAL at 09:27

## 2017-12-18 RX ADMIN — LISINOPRIL 20 MG: 20 TABLET ORAL at 09:27

## 2017-12-18 RX ADMIN — CARVEDILOL 3.12 MG: 3.12 TABLET, FILM COATED ORAL at 09:27

## 2017-12-18 RX ADMIN — GLYCOPYRROLATE 1 MG: 1 TABLET ORAL at 13:32

## 2017-12-18 RX ADMIN — ENOXAPARIN SODIUM 40 MG: 40 INJECTION SUBCUTANEOUS at 09:27

## 2017-12-18 RX ADMIN — GLYCOPYRROLATE 1 MG: 1 TABLET ORAL at 20:57

## 2017-12-18 RX ADMIN — PREGABALIN 150 MG: 75 CAPSULE ORAL at 20:57

## 2017-12-18 RX ADMIN — NICOTINE 1 PATCH: 14 PATCH, EXTENDED RELEASE TRANSDERMAL at 09:29

## 2017-12-18 NOTE — PLAN OF CARE
Problem: Patient Care Overview (Adult)  Goal: Plan of Care Review  Outcome: Ongoing (interventions implemented as appropriate)    12/18/17 0513   Coping/Psychosocial Response Interventions   Plan Of Care Reviewed With patient   Patient Care Overview   Progress improving   Outcome Evaluation   Outcome Summary/Follow up Plan pt now reports no chest pain, cath site C/D/I, safe guard remains in place, NS @ 100, VSS, troponin still trending up with last drawn being 92. continue to monitor       Goal: Adult Individualization and Mutuality  Outcome: Ongoing (interventions implemented as appropriate)  Goal: Discharge Needs Assessment  Outcome: Ongoing (interventions implemented as appropriate)    Problem: Acute Coronary Syndrome (ACS) (Adult)  Goal: Signs and Symptoms of Listed Potential Problems Will be Absent or Manageable (Acute Coronary Syndrome)  Outcome: Ongoing (interventions implemented as appropriate)    Problem: Fall Risk (Adult)  Goal: Identify Related Risk Factors and Signs and Symptoms  Outcome: Ongoing (interventions implemented as appropriate)  Goal: Absence of Falls  Outcome: Ongoing (interventions implemented as appropriate)    Problem: Pain, Chronic (Adult)  Goal: Identify Related Risk Factors and Signs and Symptoms  Outcome: Ongoing (interventions implemented as appropriate)  Goal: Acceptable Pain Control/Comfort Level  Outcome: Ongoing (interventions implemented as appropriate)    Problem: SUBSTANCE USE/ABUSE  Goal: By day 5 will complete medical detox and be discharged with an appropriate treatment plan in place.  Outcome: Ongoing (interventions implemented as appropriate)  Goal: By discharge, will develop insight into their chemical dependency and sustain motivation to continue in recovery.  Outcome: Ongoing (interventions implemented as appropriate)  Goal: By discharge, will have in place an ongoing treatment plan in collaboration with assigned CDP.  Outcome: Ongoing (interventions implemented as  appropriate)

## 2017-12-18 NOTE — PAYOR COMM NOTE
"FROM: JOSE ALBERTO SAM  PHONE: [phone]  FAX: [phone]      ID: [id number]      Serene Ramos (47 y.o. Female)     Date of Birth Social Security Number Address Home Phone MRN    1970  [address] [id number]  [id number]    Jain Marital Status          None Single       Admission Date Admission Type Admitting Provider Attending Provider Department, Room/Bed    12/17/17 Emergency Clinton Reddy MD Bose, Sanjay, MD [facility] CARDIAC CARE, C011/1    Discharge Date Discharge Disposition Discharge Destination                      Attending Provider: Clinton Reddy MD     Allergies:  Ciprofloxacin, Hydrocodone, Metoprolol, Quinolones, Sulfa Antibiotics    Isolation:  None   Infection:  None   Code Status:  FULL    Ht:  165.1 cm (65\")   Wt:  132 kg (291 lb 1.6 oz)    Admission Cmt:  None   Principal Problem:  None                Active Insurance as of 12/17/2017     Primary Coverage     Payor Plan Insurance Group Employer/Plan Group    [employer] [place] [employer] [place]ICAID      Payor Plan Address Payor Plan Phone Number Effective From Effective To    [address] [address] [phone] 11/30/2017     [place] [address]       Subscriber Name Subscriber Birth Date Member ID       SERENE RAMOS 1970 [id number]                 Emergency Contacts      (Rel.) Home Phone Work Phone Mobile Phone    Ayad Malin (Significant Other) -- -- [phone]               History & Physical      Nona Fuentes PA-C at 12/17/2017 12:42 PM     Attestation signed by Clinton Reddy MD at 12/17/2017  5:21 PM        I have seen and evaluated this patient personally. I agree with the findings, assessment and plan as outlined by mid level provider. In addition, I have the following to add.    Face to face encounter [Hour of, ( time)] : 12     LOS: 0 days   Patient Care Team:  Patrick Redd MD as PCP - General  John E Broadbent, MD as Referring Physician (Cardiology)  Clinton Reddy MD as " Cardiologist (Cardiology)    Chief Complaint: Active Problems:    ACS (acute coronary syndrome)    Chest pain    Shortness of breath  Ongoing mild chest pain for several hours  Slightly elevated troponin I    Mild  shortness of breath  Very anxious and tearful      Patient Complaints:   Chief Complaint   Patient presents with   • Chest Pain   • Cheng Transfer     Denies abdominal pain, nausea vomiting or diarrhoea.    Telemetry: no malignant arrhythmia. No significant pauses.    Review of Systems   Constitutional: Negative for chills, fatigue and fever.   HENT: Negative.    Eyes: Negative.    Respiratory: Negative for cough,   No chest wall soreness,   Mild shortness of breath,   no wheezing, no stridor.    Cardiovascular: chest pain,   No palpitations   No significant  leg swelling.   Gastrointestinal: Negative for abdominal distention,  No abdominal pain, No blood in stool, constipation, diarrhea, nausea and vomiting.   Endocrine: Negative.    Genitourinary: Negative for difficulty urinating, dysuria, flank pain and hematuria.   Musculoskeletal: Negative.    Skin: Negative for rash and wound.   Allergic/Immunologic: Negative.    Neurological: Negative for dizziness, syncope, weakness, light-headedness and headaches.   Hematological: Does not bruise/bleed easily.   Psychiatric/Behavioral: Negative for agitation, behavioral problems, confusion, the patient cummings not appear to be nervous/anxious.       History:   Past Medical History:   Diagnosis Date   • Abnormal Pap smear of cervix    • Anxiety    • Arthritis    • Asthma    • CHF (congestive heart failure)    • Chlamydia    • COPD (chronic obstructive pulmonary disease)    • Epilepsy    • MRSA (methicillin resistant staph aureus) culture positive    • Panic disorder with agoraphobia    • Seasonal allergies    • Sleep apnea    • Stroke    • Wrist fracture      Past Surgical History:   Procedure Laterality Date   • CARDIAC CATHETERIZATION     • CARDIAC CATHETERIZATION  Bilateral 12/8/2017    Procedure: Coronary angiography;  Surgeon: Clinton Reddy MD;  Location:  [facility] CATH INVASIVE LOCATION;  Service:    • CARDIAC CATHETERIZATION Left 12/8/2017    Procedure: Stent NAYAN coronary;  Surgeon: Clitnon Reddy MD;  Location:  [facility] CATH INVASIVE LOCATION;  Service:    • CARDIAC CATHETERIZATION Right 12/8/2017    Procedure: Functional Flow [place];  Surgeon: Clinton Reddy MD;  Location:  [facility] CATH INVASIVE LOCATION;  Service:    • CAROTID ENDARTERECTOMY       Social History     Social History   • Marital status: Single     Spouse name: N/A   • Number of children: N/A   • Years of education: N/A     Occupational History   • Not on file.     Social History Main Topics   • Smoking status: Current Every Day Smoker     Packs/day: 1.00     Types: Cigarettes   • Smokeless tobacco: Not on file   • Alcohol use No   • Drug use: Not on file   • Sexual activity: Defer     Other Topics Concern   • Not on file     Social History Narrative     History reviewed. No pertinent family history.    Labs:  WBC No results found for: WBC   HGB No results found for: HGB   HCT No results found for: HCT   Platlets No results found for: PLT   MCV No results found for: MCV         Invalid input(s): LABALBU, PROT  Lab Results   Component Value Date    TROPONINI 1.220 (C) 12/17/2017     PT/INR:  No results found for: PROTIME/No results found for: INR    Imaging Results (last 72 hours)     Procedure Component Value Units Date/Time    CT Angiogram Chest With Contrast [[id number]] Collected:  12/17/17 1315     Updated:  12/17/17 1322    Narrative:       CT ANGIOGRAM CHEST W CONTRAST- 12/17/2017 1:00 PM CST      HISTORY: Chest pain      COMPARISON: None.      DOSE LENGTH PRODUCT: 529 mGy cm. Automated exposure control was also  utilized to decrease patient radiation dose.     TECHNIQUE: Helical tomographic images of the chest were obtained after  the administration of intravenous contrast following angiogram  protocol.  Additionally, 3D and multiplanar reformatted images were provided.        FINDINGS:    Pulmonary arteries: There is adequate enhancement of the pulmonary  arteries to evaluate for central and segmental pulmonary emboli. There  are no filling defects within the main, lobar, segmental or visualized  subsegmental pulmonary arteries. The pulmonary arteries are within  normal limits for size.      Aorta and great vessels: The aorta is well opacified and demonstrates no  dissection or aneurysm. The great vessels are normal in appearance.     Visualized neck base: The imaged portion of the base of the neck appears  unremarkable.      Lungs: The lungs are clear, except for a 7 mm partly solid nodule in the  LEFT upper lobe on axial image 28. The trachea and bronchial tree are  patent.      Heart: The heart is normal in size. There is no pericardial effusion.      Mediastinum and lymph nodes: No enlarged mediastinal, hilar, or axillary  lymph nodes are present.      Skeletal and soft tissues: The osseous structures of the thorax and  surrounding soft tissues demonstrate no acute process.     Upper abdomen: The imaged portion of the upper abdomen demonstrates no  acute process.        Impression:       1. No evidence of pulmonary embolus or other acute cardiopulmonary  process.  2. A few bronchial secretions are noted. There is mild peribronchial  thickening. Findings are likely due to bronchitis.  3. There is a partly solid nodule in the LEFT upper lobe that should be  followed per the Fleischner criteria.     Fleischner Society Recommendations for Management of PARTLY SOLID  Pulmonary nodules:     SOLITARY NODULES:  1. For partly solid nodules measuring less than 6 mm in diameter, no  follow-up is needed.  2. For partly solid nodules measuring greater than 6 mm, CT in 3 to 6  months to confirm persistence. If unchanged and a solid component is  below 6 mm, CT annually for 5 years. (Persistent, partly solid  nodules  containing a solid component greater than 6 mm are highly suspicious).        This report was finalized on 12/17/2017 13:19 by Dr. Robb aGrcia MD.          Objective     Allergies   Allergen Reactions   • Ciprofloxacin Nausea And Vomiting   • Hydrocodone Nausea Only   • Metoprolol Itching   • Quinolones Nausea And Vomiting   • Sulfa Antibiotics Nausea And Vomiting       Medication Review: Performed  Current Facility-Administered Medications   Medication Dose Route Frequency Provider Last Rate Last Dose   • albuterol (PROVENTIL) nebulizer solution 0.083% 2.5 mg/3mL  2.5 mg Nebulization Q4H PRN Clinton Reddy MD       • amLODIPine (NORVASC) tablet 2.5 mg  2.5 mg Oral Daily Nona Fuentes PA-C   2.5 mg at 12/17/17 1701   • aspirin chewable tablet 81 mg  81 mg Oral Daily Nona Fuentes PA-C   81 mg at 12/17/17 1702   • atorvastatin (LIPITOR) tablet 80 mg  80 mg Oral Nightly Nona Fuentes PA-C       • bumetanide (BUMEX) tablet 1 mg  1 mg Oral Daily PRN Nona Fuentes PA-C       • carvedilol (COREG) tablet 3.125 mg  3.125 mg Oral Q12H Nona Fuentes PA-C       • clopidogrel (PLAVIX) tablet 75 mg  75 mg Oral Daily Nona Fuentes PA-C   75 mg at 12/17/17 1701   • cyclobenzaprine (FLEXERIL) tablet 10 mg  10 mg Oral TID PRN Nona Fuentes PA-C       • enoxaparin (LOVENOX) syringe 120 mg  120 mg Subcutaneous Q12H Nona Fuentes PA-C   120 mg at 12/17/17 1653   • glycopyrrolate (ROBINUL) tablet 1 mg  1 mg Oral 4x Daily RYAN HarpC   1 mg at 12/17/17 1707   • lisinopril (PRINIVIL,ZESTRIL) tablet 20 mg  20 mg Oral Daily RYAN HarpC   20 mg at 12/17/17 1651   • LORazepam (ATIVAN) tablet 0.5 mg  0.5 mg Oral BID PRN Clinton Reddy MD   0.5 mg at 12/17/17 1316   • nicotine (NICODERM CQ) 14 MG/24HR patch 1 patch  1 patch Transdermal Q24H Nona Fuentes PA-C   1 patch at 12/17/17 1652   • [START ON 12/18/2017] pantoprazole (PROTONIX) EC tablet 40 mg  40 mg Oral Q AM Nona Fuentes PA-C       • pregabalin  "(LYRICA) capsule 150 mg  150 mg Oral TID Clinton Reddy MD   150 mg at 12/17/17 1701   • sodium chloride 0.9 % flush 1-10 mL  1-10 mL Intravenous PRN Nona Fuentes PA-C           Vital Sign Min/Max for last 24 hours  Temp  Min: 96.9 °F (36.1 °C)  Max: 98.6 °F (37 °C)   BP  Min: 117/86  Max: 168/94   Pulse  Min: 64  Max: 89   Resp  Min: 16  Max: 16   SpO2  Min: 94 %  Max: 97 %   No Data Recorded   Weight  Min: 132 kg (291 lb 1.6 oz)  Max: 138 kg (305 lb)     Flowsheet Rows         First Filed Value    Admission Height  165.1 cm (65\") Documented at 12/17/2017 0944    Admission Weight  (!)  138 kg (305 lb) Documented at 12/17/2017 0944               Physical Exam:  Eyes: Pupils equal and reactive    Ears: Appear intact with no abnormalities noted  Nose: Nares normal, no drainage  Neck: supple, trachea midline, no carotid bruit and no JVD  Back: no kyphosis present,    Lungs: respirations regular, respirations even and respirations unlabored  Heart: normal S1, S2,  No murmurs,  no rub and no click  Abdomen: soft  Skin: no bleeding, bruising or rash  Extremities no cyanosis     Results Review:   I reviewed the patient's new clinical results.  I reviewed the patient's new imaging results and agree with the interpretation.  I reviewed the patient's other test results and agree with the interpretation  I personally viewed and interpreted the patient's EKG/Telemetry data  Discussed with patient,       Agree with assessment and plan of mid level provider as below with any changes if made as noted by Nona Fuentes PAC      Assessment/Plan     Active Problems:    ACS (acute coronary syndrome)    Chest pain  dyspnea on exertion    Morbid obesity  Elevated D Dimer  Right leg slightly colder than left on physical exam    Plan  Agree with CTA pulmonary arteries  B/L lower extremity arterial and venous doppler  Anticoagulation with Lovenox  Rule out Acute coronary syndrome   Serial troponins  Admit to CCU  Supportive " care  Telemetry  Optimal medical therapy  Deep vein thrombosis prophylaxis/precautions  Appropriate diet, fluid, sodium, caffeine, stimulants intake   Compliance to diet and medications   Avoid NSAIDS    Clinton Reddy MD  12/17/17  5:15 PM                                    [facility] HISTORY AND PHYSICAL      Patient Care Team:  Patrick Redd MD as PCP - General  John E Broadbent, MD as Referring Physician (Cardiology)  Clinton Reddy MD as Cardiologist (Cardiology)    Chief complaint : chest pain     Subjective     Serene Cr is a 47 y.o. female  With history of coronary artery disease, hypertension, anxiety, obesity, tobacco abuse who presents to [facility] ED as a transfer from [facility] with complaints of substernal chest pain. The patient relates that she was sitting at home and began having chest pain around 2 am today. She describes this as substernal, squeezing, at worst 5/10. She describes some mild shortness of breath associated with this. She recently had stent placed to LCX and reports compliance with medications. The patient admits continued tobacco abuse. The patient tells me that since her cardiac cath she has had some lower extremity pain with ambulation, as well as a cold feeling to her foot. She states that both of her feet have been like this for awhile, but worsening on the right. She denies any excessive exertional dyspnea, leg swelling or similar. She appears very anxious on my exam and tells me she wants to take her ativan out of her purse.     Review of Systems  Review of Systems   Constitution: Negative for malaise/fatigue and weight gain.   Cardiovascular: Positive for chest pain and dyspnea on exertion. Negative for claudication, irregular heartbeat, leg swelling, near-syncope, orthopnea, palpitations, paroxysmal nocturnal dyspnea and syncope.   Respiratory: Negative for hemoptysis and shortness of breath.    Hematologic/Lymphatic: Negative for bleeding problem.   Skin:  Negative for poor wound healing.   Musculoskeletal: Negative for myalgias.   Gastrointestinal: Negative for melena, nausea and vomiting.   Genitourinary: Negative for hematuria.   Neurological: Negative for focal weakness and light-headedness.   Psychiatric/Behavioral: Negative for memory loss.   All other systems reviewed and are negative.       History  Past Medical History:   Diagnosis Date   • Abnormal Pap smear of cervix    • Anxiety    • Arthritis    • Asthma    • CHF (congestive heart failure)    • Chlamydia    • COPD (chronic obstructive pulmonary disease)    • Epilepsy    • MRSA (methicillin resistant staph aureus) culture positive    • Seasonal allergies    • Sleep apnea    • Stroke    • Wrist fracture      Past Surgical History:   Procedure Laterality Date   • CARDIAC CATHETERIZATION     • CARDIAC CATHETERIZATION Bilateral 12/8/2017    Procedure: Coronary angiography;  Surgeon: Clinton Reddy MD;  Location:  [facility] CATH INVASIVE LOCATION;  Service:    • CARDIAC CATHETERIZATION Left 12/8/2017    Procedure: Stent NAYAN coronary;  Surgeon: Clinton Reddy MD;  Location:  [facility] CATH INVASIVE LOCATION;  Service:    • CARDIAC CATHETERIZATION Right 12/8/2017    Procedure: Functional Flow [place];  Surgeon: Clinton Reddy MD;  Location:  [facility] CATH INVASIVE LOCATION;  Service:    • CAROTID ENDARTERECTOMY       History reviewed. No pertinent family history.  Social History   Substance Use Topics   • Smoking status: Current Every Day Smoker     Packs/day: 1.00     Types: Cigarettes   • Smokeless tobacco: None   • Alcohol use No       Medications  Prior to Admission medications    Medication Sig Start Date End Date Taking? Authorizing Provider   albuterol (PROVENTIL HFA;VENTOLIN HFA) 108 (90 Base) MCG/ACT inhaler Inhale 2 puffs Every 4 (Four) Hours As Needed for Wheezing.    Historical Provider, MD   amLODIPine (NORVASC) 2.5 MG tablet Take 2.5 mg by mouth Daily.    Historical Provider, MD   aspirin 81 MG chewable tablet Chew  81 mg Daily.    Historical Provider, MD   atorvastatin (LIPITOR) 80 MG tablet Take 1 tablet by mouth Every Night. 12/9/17   RAMONITA Estevez   bumetanide (BUMEX) 1 MG tablet Take 1 mg by mouth Daily As Needed (swelling).    Historical Provider, MD   carvedilol (COREG) 3.125 MG tablet Take 1 tablet by mouth Every 12 (Twelve) Hours. 12/9/17   RAMONITA Estevez   clopidogrel (PLAVIX) 75 MG tablet Take 75 mg by mouth Daily.    Historical Provider, MD   cyclobenzaprine (FLEXERIL) 10 MG tablet Take 10 mg by mouth 3 (Three) Times a Day As Needed for Muscle Spasms.    Historical Provider, MD   diclofenac (VOLTAREN) 75 MG EC tablet Take 75 mg by mouth 2 (Two) Times a Day.    Historical Provider, MD   fexofenadine-pseudoephedrine (ALLEGRA-D 24) 180-240 MG per 24 hr tablet Take 1 tablet by mouth Daily.    Historical Provider, MD   fluconazole (DIFLUCAN) 150 MG tablet Take 150 mg by mouth Daily.    Historical Provider, MD   glycopyrrolate (ROBINUL) 1 MG tablet Take 1 mg by mouth 4 (Four) Times a Day.    Historical Provider, MD   lisinopril (PRINIVIL,ZESTRIL) 20 MG tablet Take 1 tablet by mouth Daily. 12/10/17   RAMONITA Estevez   LORazepam (ATIVAN) 0.5 MG tablet Take 0.5 mg by mouth 2 (Two) Times a Day As Needed for Anxiety.    Historical Provider, MD   omeprazole (priLOSEC) 20 MG capsule Take 20 mg by mouth Daily.    Historical Provider, MD   potassium chloride (KLOR-CON) 8 MEQ CR tablet Take 20 mEq by mouth Daily As Needed (when bumex is used).    Historical Provider, MD   pregabalin (LYRICA) 150 MG capsule Take 150 mg by mouth 3 (Three) Times a Day.    Historical Provider, MD       Current Facility-Administered Medications   Medication Dose Route Frequency Provider Last Rate Last Dose   • LORazepam (ATIVAN) tablet 0.5 mg  0.5 mg Oral BID PRN Clinton Reddy MD       • pregabalin (LYRICA) capsule 150 mg  150 mg Oral TID Clinton Reddy MD         Current Outpatient Prescriptions   Medication Sig Dispense Refill   •  albuterol (PROVENTIL HFA;VENTOLIN HFA) 108 (90 Base) MCG/ACT inhaler Inhale 2 puffs Every 4 (Four) Hours As Needed for Wheezing.     • amLODIPine (NORVASC) 2.5 MG tablet Take 2.5 mg by mouth Daily.     • aspirin 81 MG chewable tablet Chew 81 mg Daily.     • atorvastatin (LIPITOR) 80 MG tablet Take 1 tablet by mouth Every Night. 90 tablet 3   • bumetanide (BUMEX) 1 MG tablet Take 1 mg by mouth Daily As Needed (swelling).     • carvedilol (COREG) 3.125 MG tablet Take 1 tablet by mouth Every 12 (Twelve) Hours. 180 tablet 3   • clopidogrel (PLAVIX) 75 MG tablet Take 75 mg by mouth Daily.     • cyclobenzaprine (FLEXERIL) 10 MG tablet Take 10 mg by mouth 3 (Three) Times a Day As Needed for Muscle Spasms.     • diclofenac (VOLTAREN) 75 MG EC tablet Take 75 mg by mouth 2 (Two) Times a Day.     • fexofenadine-pseudoephedrine (ALLEGRA-D 24) 180-240 MG per 24 hr tablet Take 1 tablet by mouth Daily.     • fluconazole (DIFLUCAN) 150 MG tablet Take 150 mg by mouth Daily.     • glycopyrrolate (ROBINUL) 1 MG tablet Take 1 mg by mouth 4 (Four) Times a Day.     • lisinopril (PRINIVIL,ZESTRIL) 20 MG tablet Take 1 tablet by mouth Daily. 90 tablet 3   • LORazepam (ATIVAN) 0.5 MG tablet Take 0.5 mg by mouth 2 (Two) Times a Day As Needed for Anxiety.     • omeprazole (priLOSEC) 20 MG capsule Take 20 mg by mouth Daily.     • potassium chloride (KLOR-CON) 8 MEQ CR tablet Take 20 mEq by mouth Daily As Needed (when bumex is used).     • pregabalin (LYRICA) 150 MG capsule Take 150 mg by mouth 3 (Three) Times a Day.          Allergies:  Ciprofloxacin; Hydrocodone; Metoprolol; Quinolones; and Sulfa antibiotics    Objective     Vital Signs  Temp:  [96.9 °F (36.1 °C)] 96.9 °F (36.1 °C)  Heart Rate:  [89] 89  Resp:  [16] 16  BP: (165)/(72) 165/72    Labs  Lab Results (last 72 hours)     Procedure Component Value Units Date/Time    D-dimer, Quantitative [[id number]]  (Abnormal) Collected:  12/17/17 0955    Specimen:  Blood Updated:  12/17/17 1022      D-Dimer, Quantitative 0.67 (H) mg/L (FEU)     Narrative:       Reference Range is 0-0.50 mg/L FEU. However, results <0.50 mg/L FEU tends to rule out DVT or PE. Results >0.50 mg/L FEU are not useful in predicting absence or presence of DVT or PE.    Holden Draw [[id number]] Collected:  12/17/17 0955    Specimen:  Blood Updated:  12/17/17 1101    Narrative:       The following orders were created for panel order Holden Draw.  Procedure                               Abnormality         Status                     ---------                               -----------         ------                     Lavender Top[[id number]]                                     Final result               Red Top[[id number]]                                          Final result                 Please view results for these tests on the individual orders.    Lavender Top [[id number]] Collected:  12/17/17 0955    Specimen:  Blood Updated:  12/17/17 1101     Extra Tube hold for add-on      Auto resulted       Red Top [[id number]] Collected:  12/17/17 0955    Specimen:  Blood Updated:  12/17/17 1101     Extra Tube Hold for add-ons.      Auto resulted.       Troponin [[id number]]  (Abnormal) Collected:  12/17/17 0955    Specimen:  Blood Updated:  12/17/17 1114     Troponin I 1.220 (C) ng/mL           Physical Exam:  Physical Exam   Constitutional: She is oriented to person, place, and time. She appears well-developed and well-nourished.   Morbid obesity    HENT:   Head: Normocephalic and atraumatic.   Eyes: Conjunctivae and EOM are normal. Pupils are equal, round, and reactive to light.   Neck: Normal range of motion. Neck supple. No JVD present.   Cardiovascular: Normal rate, regular rhythm, S1 normal, S2 normal, normal heart sounds and intact distal pulses.    No murmur heard.  Pulses:       Dorsalis pedis pulses are 0 on the right side, and 1+ on the left side.        Posterior tibial pulses are 0 on the right side, and 1+ on the left side.    Pulmonary/Chest: Effort normal and breath sounds normal. No respiratory distress.   Abdominal: Soft. Bowel sounds are normal. She exhibits no distension.   Musculoskeletal: She exhibits no edema.   Neurological: She is alert and oriented to person, place, and time.   Skin: Skin is warm and dry.   Psychiatric: She has a normal mood and affect. Judgment normal.   Vitals reviewed.      Results Review:    I reviewed the patient's new clinical results.  I reviewed the patient's new imaging results and agree with the interpretation.  I reviewed the patient's other test results and agree with the interpretation  I personally viewed and interpreted the patient's EKG/Telemetry data    Assessment   1. Non ST elevation myocardial infarction with chest pain now resolved  2. Absent DP/PT pulses of right lower extremity    3. Coronary artery disease: recent stent placement to LCX on 12/8/17  4. Tobacco abuse: 1 ppd  5. Hypertension  6. Fibromyalgia per patient report  7. Panic disorder and agoraphobia per patient report  8. Marijuana use    Plan   1. Admit to CCU under care of Dr. Reddy  2. Continue trending troponin. If continues to rise, especially with symptoms, patient will likely need cardiac cath tomorrow.   3. CTA chest given elevated d dimer and patient complaints  4. STAT arterial ultrasound bilaterally, venous doppler bilaterally  5. Echo  6. Urine drug screen   7. Continue home meds, including aspirin and plavix. Therapeutic lovenox for now.   8. Counseled on tobacco abuse cessation     I discussed the patients findings and my recommendations with patient and consulting provider.     Nona Fuentes PA-C  12/17/17  12:42 PM      Please note this cardiology history and physical note is the result of a face to face consultation with the patient, in addition to reviewing medical records at length by myself, Nona Fuentes PA-C.    Time: appx 40 minutes       Electronically signed by Clinton Reddy MD at 12/17/2017  5:21 PM            Emergency Department Notes      Josef Navarrete MD at 12/17/2017  9:47 AM          Subjective   Patient is a 47 y.o. female presenting with chest pain.   Chest Pain   Pain location:  Substernal area  Pain quality: aching and burning    Pain radiates to:  Does not radiate  Onset quality:  Gradual  Timing:  Intermittent  Progression:  Worsening  Chronicity:  Recurrent  Context: not breathing, not drug use, not eating, not lifting, not movement, not raising an arm, not at rest and not trauma    Relieved by:  Nothing  Worsened by:  Nothing  Associated symptoms: cough and nausea    Associated symptoms: no abdominal pain, no AICD problem, no anorexia, no anxiety, no back pain, no dysphagia, no fatigue, no heartburn, no lower extremity edema, no near-syncope, no numbness, no orthopnea, no palpitations and no weakness    Risk factors: coronary artery disease, high cholesterol, hypertension, obesity and smoking    Risk factors: no aortic disease, no birth control, not male, no Marfan's syndrome and no surgery        Review of Systems   Constitutional: Negative for fatigue.   HENT: Negative for trouble swallowing.    Respiratory: Positive for cough.    Cardiovascular: Positive for chest pain. Negative for palpitations, orthopnea and near-syncope.   Gastrointestinal: Positive for nausea. Negative for abdominal pain, anorexia and heartburn.   Musculoskeletal: Negative for back pain.   Neurological: Negative for weakness and numbness.       Past Medical History:   Diagnosis Date   • Abnormal Pap smear of cervix    • Anxiety    • Arthritis    • Asthma    • CHF (congestive heart failure)    • Chlamydia    • COPD (chronic obstructive pulmonary disease)    • Epilepsy    • MRSA (methicillin resistant staph aureus) culture positive    • Seasonal allergies    • Sleep apnea    • Stroke    • Wrist fracture        Allergies   Allergen Reactions   • Ciprofloxacin Nausea And Vomiting   • Hydrocodone Nausea Only   • Metoprolol Itching    • Quinolones Nausea And Vomiting   • Sulfa Antibiotics Nausea And Vomiting       Past Surgical History:   Procedure Laterality Date   • CARDIAC CATHETERIZATION     • CARDIAC CATHETERIZATION Bilateral 12/8/2017    Procedure: Coronary angiography;  Surgeon: Clinton Reddy MD;  Location:  [facility];  Service:    • CARDIAC CATHETERIZATION Left 12/8/2017    Procedure: Stent NAYAN coronary;  Surgeon: Clinton Reddy MD;  Location:  [facility];  Service:    • CARDIAC CATHETERIZATION Right 12/8/2017    Procedure: Functional Flow Hubbard;  Surgeon: Clinton Reddy MD;  Location:  [facility];  Service:    • CAROTID ENDARTERECTOMY         History reviewed. No pertinent family history.    Social History     Social History   • Marital status: Single     Spouse name: N/A   • Number of children: N/A   • Years of education: N/A     Social History Main Topics   • Smoking status: Current Every Day Smoker     Packs/day: 1.00     Types: Cigarettes   • Smokeless tobacco: None   • Alcohol use No   • Drug use: None   • Sexual activity: Defer     Other Topics Concern   • None     Social History Narrative           Objective   Physical Exam   Constitutional: She is oriented to person, place, and time. She appears well-developed and well-nourished.   HENT:   Head: Normocephalic.   Right Ear: External ear normal.   Eyes: Conjunctivae are normal. Pupils are equal, round, and reactive to light.   Neck: Normal range of motion. Neck supple.   Cardiovascular: Normal rate, regular rhythm, normal heart sounds and intact distal pulses.  PMI is not displaced.  Exam reveals no decreased pulses.    No murmur heard.  Pulmonary/Chest: Effort normal and breath sounds normal. No accessory muscle usage. No tachypnea. No respiratory distress. She has no decreased breath sounds. She has no wheezes. She has no rales. She exhibits no tenderness.   Abdominal: Soft. Bowel sounds are normal. There is no tenderness.    Musculoskeletal: Normal range of motion. She exhibits no edema or tenderness.   Lower extremity exam bilaterally is unremarkable.  There is no right or left calf tenderness .  There is no palpable venous cord.  No obvious difference in the size of the legs.  No pitting edema.  The dorsalis pedis and posterior tibial femoral and popliteal pulses are palpable and +2 bilaterally.  Homans sign is negative       Vascular Status -  Her exam exhibits right foot vasculature normal. Her exam exhibits no right foot edema. Her exam exhibits left foot vasculature normal. Her exam exhibits no left foot edema.  Neurological: She is alert and oriented to person, place, and time. She has normal reflexes. No cranial nerve deficit. Coordination normal.   Skin: Skin is warm. No rash noted. No erythema.   Nursing note and vitals reviewed.      Procedures        ED Course  ED Course   Comment By Time   The left circumflex arises form the left man artery and supplies obtuse marginal branches mid to distal left circumflex coronary artery has a 70% stenosis this was treated with a 2.5×15 mm Alpine drug-eluting stent with 0% residual stenosis EMMY-3 flow before and after procedure.      Right coronary artery:  The RCA arises normally from the right coronary cusp and is dominant for the posterior circulation.  The RCA is codominant and  proximal to midportion has approximately 60% stenosis.  Fractional flow reserve was performed this is normal at above 0.95. 12/08 Josef Navarrete MD 12/17 7890   Case discussed with cardiology ct pending she has received lovenox will admi Josef Navarrete MD 12/17 1210                  [facility]    Final diagnoses:   ACS (acute coronary syndrome)   Lung nodule            Josef Navarrete MD  12/17/17 1211       Josef Navarrete MD  12/17/17 1353       Electronically signed by Josef Navarrete MD at 12/17/2017  1:53 PM      Kate Rendon, RN at 12/17/2017 10:02 AM          Patient was transferred by [facility] [place]  patient received the following medications: 4mg Morphine IVP at 0830, 25 mg Toporol PO at 0830, 80mg Lipitor PO at 0830, Lovenox 120 mg Sub Q at 0830, 4mg Zofran IVP at 0830. Patient self administered 4 nitro at home, with n pain relief, and took 324 of Aspirin. Patient denies any ED medications.     Electronically signed by Kate Rendon RN at 12/17/2017 10:09 AM      Kate Rendon RN at 12/17/2017  1:45 PM          Patient is currently experiencing chest pain at a 3/10. Dr. Reddy paged for orders.     Kate Rendon RN  12/17/17 1424       Electronically signed by Kate Rendon RN at 12/17/2017  2:24 PM      Kate Rendon RN at 12/17/2017  2:04 PM          Dr. Reddy paged again for patients chest pain     Kate Rendon RN  12/17/17 1424       Electronically signed by Kate Rendon RN at 12/17/2017  2:24 PM      Kate Rendon RN at 12/17/2017  2:24 PM          Nona, with Dr. Reddy pagestella regarding patients chest pain     Kate Rendon RN  12/17/17 1424       Electronically signed by Kate Rendon RN at 12/17/2017  2:24 PM      Kate Rendon RN at 12/17/2017  2:27 PM          Dr. Reddy returned call to ED. Dr. Reddy made aware of patients chest pain, rating a 3/10. Patient cleared to go to CCU with chest pain.     Kate Rendon RN  12/17/17 1428       Electronically signed by Kate Rendon RN at 12/17/2017  2:28 PM        Vital Signs (last 72 hrs)       12/15 0700  -  12/16 0659 12/16 0700  -  12/17 0659 12/17 0700  -  12/18 0659 12/18 0700  -  12/18 1051   Most Recent    Temp (°F)     96.9 -  98.6      97.9     97.9 (36.6)    Heart Rate     64 -  89      71     71    Resp     14 -  20       19    BP     90/48 -  168/94      90/57     90/57    SpO2 (%)     (!)89 -  99      94     94          Hospital Medications (all)       Dose Frequency Start End    albuterol (PROVENTIL) nebulizer solution 0.083% 2.5 mg/3mL 2.5 mg Every 4 Hours PRN 12/17/2017      Sig - Route: Take 2.5 mg by nebulization Every 4 (Four) Hours As Needed for Wheezing. - Nebulization    amLODIPine (NORVASC) tablet 2.5 mg 2.5 mg Daily 12/17/2017     Sig - Route: Take 0.5 tablets by mouth Daily. - Oral    Cosign for Ordering: Accepted by Clinton Reddy MD on 12/17/2017  5:38 PM    aspirin chewable tablet 81 mg 81 mg Daily 12/17/2017     Sig - Route: Chew 1 tablet Daily. - Oral    Cosign for Ordering: Accepted by Clinton Reddy MD on 12/17/2017  5:38 PM    atorvastatin (LIPITOR) tablet 80 mg 80 mg Nightly 12/17/2017     Sig - Route: Take 2 tablets by mouth Every Night. - Oral    Cosign for Ordering: Accepted by Clinton Reddy MD on 12/17/2017  5:38 PM    bumetanide (BUMEX) tablet 1 mg 1 mg Daily PRN 12/17/2017     Sig - Route: Take 1 tablet by mouth Daily As Needed (swelling). - Oral    Cosign for Ordering: Accepted by Clinton Reddy MD on 12/17/2017  5:38 PM    carvedilol (COREG) tablet 3.125 mg 3.125 mg Every 12 Hours Scheduled 12/17/2017     Sig - Route: Take 1 tablet by mouth Every 12 (Twelve) Hours. - Oral    Cosign for Ordering: Accepted by Clinton Reddy MD on 12/17/2017  5:38 PM    cyclobenzaprine (FLEXERIL) tablet 10 mg 10 mg 3 Times Daily PRN 12/17/2017     Sig - Route: Take 1 tablet by mouth 3 (Three) Times a Day As Needed for Muscle Spasms. - Oral    Cosign for Ordering: Accepted by Clinton Reddy MD on 12/17/2017  5:38 PM    enoxaparin (LOVENOX) syringe 40 mg 40 mg Daily 12/18/2017     Sig - Route: Inject 0.4 mL under the skin Daily. - Subcutaneous    fluconazole (DIFLUCAN) tablet 150 mg 150 mg Once 12/17/2017 12/18/2017    Sig - Route: Take 1 tablet by mouth 1 (One) Time. - Oral    glycopyrrolate (ROBINUL) tablet 1 mg 1 mg 4 Times Daily 12/17/2017     Sig - Route: Take 1 tablet by mouth 4 (Four) Times a Day. - Oral    Cosign for Ordering: Accepted by Clinton Reddy MD on 12/17/2017  5:38 PM    iopamidol (ISOVUE-370) 76 % injection 150 mL 150 mL Once in Imaging 12/17/2017 12/17/2017    Sig -  Route: Infuse 150 mL into a venous catheter Once. - Intravenous    lisinopril (PRINIVIL,ZESTRIL) tablet 20 mg 20 mg Daily 12/17/2017     Sig - Route: Take 1 tablet by mouth Daily. - Oral    Cosign for Ordering: Accepted by Clinton Reddy MD on 12/17/2017  5:38 PM    LORazepam (ATIVAN) tablet 0.5 mg 0.5 mg 2 Times Daily PRN 12/17/2017     Sig - Route: Take 1 tablet by mouth 2 (Two) Times a Day As Needed for Anxiety. - Oral    nicotine (NICODERM CQ) 14 MG/24HR patch 1 patch 1 patch Every 24 Hours Scheduled 12/17/2017     Sig - Route: Place 1 patch on the skin Daily. - Transdermal    Cosign for Ordering: Accepted by Clinton Reddy MD on 12/17/2017  5:38 PM    pantoprazole (PROTONIX) EC tablet 40 mg 40 mg Every Early Morning 12/18/2017     Sig - Route: Take 1 tablet by mouth Every Morning. - Oral    Cosign for Ordering: Accepted by Clinton Reddy MD on 12/17/2017  5:38 PM    potassium chloride (MICRO-K) CR capsule 20 mEq 20 mEq Daily PRN 12/17/2017     Sig - Route: Take 2 capsules by mouth Daily As Needed (ONLY when Bumex is taken.). - Oral    pregabalin (LYRICA) capsule 150 mg 150 mg 3 Times Daily 12/17/2017     Sig - Route: Take 2 capsules by mouth 3 (Three) Times a Day. - Oral    sodium chloride 0.9 % flush 1-10 mL 1-10 mL As Needed 12/17/2017     Sig - Route: Infuse 1-10 mL into a venous catheter As Needed for Line Care. - Intravenous    Cosign for Ordering: Accepted by Clinton Reddy MD on 12/17/2017  5:38 PM    sodium chloride 0.9 % infusion 100 mL/hr Continuous 12/17/2017     Sig - Route: Infuse 100 mL/hr into a venous catheter Continuous. - Intravenous    sodium chloride 0.9 % infusion 100 mL/hr Continuous 12/17/2017     Sig - Route: Infuse 100 mL/hr into a venous catheter Continuous. - Intravenous    ticagrelor (BRILINTA) tablet 90 mg 90 mg 2 Times Daily 12/18/2017     Sig - Route: Take 1 tablet by mouth 2 (Two) Times a Day. - Oral    albuterol (PROVENTIL HFA;VENTOLIN HFA) inhaler 2 puff (Discontinued) 2 puff Every 4  Hours PRN 12/17/2017 12/17/2017    Sig - Route: Inhale 2 puffs Every 4 (Four) Hours As Needed for Wheezing. - Inhalation    Reason for Discontinue: Formulary change    Cosign for Ordering: Accepted by Clinton Reddy MD on 12/17/2017  5:38 PM    clopidogrel (PLAVIX) tablet 75 mg (Discontinued) 75 mg Daily 12/17/2017 12/17/2017    Sig - Route: Take 1 tablet by mouth Daily. - Oral    Cosign for Ordering: Accepted by Clinton Reddy MD on 12/17/2017  5:38 PM    diphenhydrAMINE (BENADRYL) injection (Discontinued)  As Needed 12/17/2017 12/17/2017    Sig: As Needed.    Reason for Discontinue: Patient Discharge    enoxaparin (LOVENOX) syringe 120 mg (Discontinued) 120 mg Every 12 Hours Scheduled 12/17/2017 12/18/2017    Sig - Route: Inject 0.8 mL under the skin Every 12 (Twelve) Hours. - Subcutaneous    Notes to Pharmacy: No warfarin reported PTA    Cosign for Ordering: Accepted by Clinton Reddy MD on 12/17/2017  5:38 PM    enoxaparin (LOVENOX) syringe (Discontinued)  As Needed 12/17/2017 12/17/2017    Sig: As Needed.    Reason for Discontinue: Patient Discharge    FentaNYL Citrate (PF) (SUBLIMAZE) injection (Discontinued)  As Needed 12/17/2017 12/17/2017    Sig: As Needed.    Reason for Discontinue: Patient Discharge    iopamidol (ISOVUE-370) 76 % injection (Discontinued)  As Needed 12/17/2017 12/17/2017    Sig: As Needed.    Reason for Discontinue: Patient Discharge    lidocaine (XYLOCAINE) 2% injection (Discontinued)  As Needed 12/17/2017 12/17/2017    Sig: As Needed.    Reason for Discontinue: Patient Discharge    midazolam (VERSED) injection (Discontinued)  As Needed 12/17/2017 12/17/2017    Sig: As Needed.    Reason for Discontinue: Patient Discharge    nitroglycerin (TRIDIL) injection (Discontinued)  As Needed 12/17/2017 12/17/2017    Sig: As Needed.    Reason for Discontinue: Patient Discharge    ticagrelor (BRILINTA) tablet (Discontinued)  As Needed 12/17/2017 12/17/2017    Sig: As Needed.    Reason for Discontinue:  Patient Discharge          Lab Results (last 72 hours)     Procedure Component Value Units Date/Time    D-dimer, Quantitative [[id number]]  (Abnormal) Collected:  12/17/17 0955    Specimen:  Blood Updated:  12/17/17 1022     D-Dimer, Quantitative 0.67 (H) mg/L (FEU)     Narrative:       Reference Range is 0-0.50 mg/L FEU. However, results <0.50 mg/L FEU tends to rule out DVT or PE. Results >0.50 mg/L FEU are not useful in predicting absence or presence of DVT or PE.    [place] Draw [[id number]] Collected:  12/17/17 0955    Specimen:  Blood Updated:  12/17/17 1101    Narrative:       The following orders were created for panel order [place] Draw.  Procedure                               Abnormality         Status                     ---------                               -----------         ------                     Lavender Top[[id number]]                                     Final result               Red Top[[id number]]                                          Final result                 Please view results for these tests on the individual orders.    Lavender Top [[id number]] Collected:  12/17/17 0955    Specimen:  Blood Updated:  12/17/17 1101     Extra Tube hold for add-on      Auto resulted       Red Top [[id number]] Collected:  12/17/17 0955    Specimen:  Blood Updated:  12/17/17 1101     Extra Tube Hold for add-ons.      Auto resulted.       Troponin [[id number]]  (Abnormal) Collected:  12/17/17 0955    Specimen:  Blood Updated:  12/17/17 1114     Troponin I 1.220 (C) ng/mL     Hemoglobin A1c [[id number]] Collected:  12/17/17 1511    Specimen:  Blood Updated:  12/17/17 1545     Hemoglobin A1C 6.4 %     Narrative:       Less than 6.0           Non-Diabetic Range  6.0-7.0                 ADA Therapeutic Target  Greater than 7.0        Action Suggested    Lipid Panel [[id number]]  (Abnormal) Collected:  12/17/17 0955    Specimen:  Blood Updated:  12/17/17 1602     Total Cholesterol 221 (H) mg/dL      Triglycerides 265  (H) mg/dL      HDL Cholesterol 42 (L) mg/dL      LDL Cholesterol  146 (H) mg/dL      LDL/HDL Ratio 3.00    Troponin [[id number]]  (Abnormal) Collected:  12/17/17 1747    Specimen:  Blood Updated:  12/17/17 1826     Troponin I 26.200 (C) ng/mL     Urine Drug Screen - Urine, Clean Catch [[id number]]  (Abnormal) Collected:  12/17/17 1821    Specimen:  Urine from Urine, Clean Catch Updated:  12/17/17 1915     Amphetamine Screen, Urine Positive (A)     Barbiturates Screen, Urine Negative     Benzodiazepine Screen, Urine Negative     Cocaine Screen, Urine Negative     Methadone Screen, Urine Negative     Opiate Screen Positive (A)     Phencyclidine (PCP), Urine Negative     THC, Screen, Urine Positive (A)    Narrative:       Negative Thresholds For Drugs Screened in Urine:    Amphetamines          500 ng/ml  Barbiturates          200 ng/ml  Benzodiazepines       200 ng/ml  Cocaine               150 ng/ml  Methadone             150 ng/ml  Opiates               300 ng/ml  Phencyclidine         25 ng/ml  THC                      50 ng/ml    The normal value for all drugs tested is negative. This report includes final unconfirmed screening results.  A positive result by this assay can be, at your request, sent to the Reference Lab for confirmation by gas chromatography. Unconfirmed results must not be used for non-medical purposes, such as employment or legal testing. Clinical consideration should be applied to any drug of abuse test result, particularly when unconfirmed results are used.    P2Y12 Platelet Inhibition [[id number]]  (Abnormal) Collected:  12/17/17 2127    Specimen:  Blood Updated:  12/17/17 2144     Hematocrit 34.2 (L) %      Platelets 453 (H) 10*3/mm3      P2Y12 Reactivity Unit 271 PRU     Narrative:       PRU reference range 194-418 is for patients not receiving P2Y12 drug. Post Drug results: Lower PRU levels are associated with expected antiplatelet effect. Values may be below the stated reference range above.  The post-drug PRU values reported are .        CBC & Differential [[id number]] Collected:  12/17/17 2248    Specimen:  Blood Updated:  12/17/17 2307    Narrative:       The following orders were created for panel order CBC & Differential.  Procedure                               Abnormality         Status                     ---------                               -----------         ------                     CBC Auto Differential[[id number]]        Abnormal            Final result                 Please view results for these tests on the individual orders.    CBC Auto Differential [[id number]]  (Abnormal) Collected:  12/17/17 2248    Specimen:  Blood Updated:  12/17/17 2307     WBC 20.43 (H) 10*3/mm3      RBC 4.09 (L) 10*6/mm3      Hemoglobin 11.5 (L) g/dL      Hematocrit 35.5 (L) %      MCV 86.8 fL      MCH 28.1 pg      MCHC 32.4 (L) g/dL      RDW 13.6 %      RDW-SD 43.4 fl      MPV 9.4 fL      Platelets 421 (H) 10*3/mm3      Neutrophil % 61.9 %      Lymphocyte % 26.9 %      Monocyte % 9.3 %      Eosinophil % 1.1 %      Basophil % 0.4 %      Immature Grans % 0.4 %      Neutrophils, Absolute 12.67 (H) 10*3/mm3      Lymphocytes, Absolute 5.49 (H) 10*3/mm3      Monocytes, Absolute 1.89 (H) 10*3/mm3      Eosinophils, Absolute 0.22 10*3/mm3      Basophils, Absolute 0.08 10*3/mm3      Immature Grans, Absolute 0.08 (H) 10*3/mm3      nRBC 0.0 /100 WBC     aPTT [[id number]]  (Abnormal) Collected:  12/17/17 2248    Specimen:  Blood Updated:  12/17/17 2315     PTT 45.4 (H) seconds     Protime-INR [[id number]]  (Normal) Collected:  12/17/17 2248    Specimen:  Blood Updated:  12/17/17 2315     Protime 14.0 Seconds      INR 1.05    CBC (No Diff) [[id number]]  (Abnormal) Collected:  12/18/17 0210    Specimen:  Blood Updated:  12/18/17 0231     WBC 16.30 (H) 10*3/mm3      RBC 4.12 (L) 10*6/mm3      Hemoglobin 11.6 (L) g/dL      Hematocrit 35.7 (L) %      MCV 86.7 fL      MCH 28.2 pg      MCHC 32.5 (L) g/dL      RDW 13.7 %       RDW-SD 43.3 fl      MPV 9.1 fL      Platelets 431 (H) 10*3/mm3     Basic Metabolic Panel [[id number]]  (Normal) Collected:  12/18/17 0210    Specimen:  Blood Updated:  12/18/17 0247     Glucose 100 mg/dL      BUN 16 mg/dL      Creatinine 0.92 mg/dL      Sodium 139 mmol/L      Potassium 3.7 mmol/L      Chloride 105 mmol/L      CO2 27.0 mmol/L      Calcium 8.4 mg/dL      eGFR Non African Amer 66 mL/min/1.73      BUN/Creatinine Ratio 17.4     Anion Gap 7.0 mmol/L     Narrative:       GFR Normal >60  Chronic Kidney Disease <60  Kidney Failure <15    Lipid Panel [[id number]]  (Abnormal) Collected:  12/18/17 0210    Specimen:  Blood Updated:  12/18/17 0255     Total Cholesterol 160 mg/dL      Triglycerides 358 (H) mg/dL      HDL Cholesterol 28 (L) mg/dL      LDL Cholesterol  103 (H) mg/dL      LDL/HDL Ratio 2.16    Troponin [[id number]]  (Abnormal) Collected:  12/18/17 0210    Specimen:  Blood Updated:  12/18/17 0337     Troponin I 92.000 (C) ng/mL     Troponin [[id number]]  (Abnormal) Collected:  12/18/17 0626    Specimen:  Blood Updated:  12/18/17 0703     Troponin I 62.600 (C) ng/mL       Specimen hemolyzed.  Results may be affected.             Imaging Results (last 72 hours)     Procedure Component Value Units Date/Time    CT Angiogram Chest With Contrast [[id number]] Collected:  12/17/17 1315     Updated:  12/17/17 1322    Narrative:       CT ANGIOGRAM CHEST W CONTRAST- 12/17/2017 1:00 PM CST      HISTORY: Chest pain      COMPARISON: None.      DOSE LENGTH PRODUCT: 529 mGy cm. Automated exposure control was also  utilized to decrease patient radiation dose.     TECHNIQUE: Helical tomographic images of the chest were obtained after  the administration of intravenous contrast following angiogram protocol.  Additionally, 3D and multiplanar reformatted images were provided.        FINDINGS:    Pulmonary arteries: There is adequate enhancement of the pulmonary  arteries to evaluate for central and segmental pulmonary emboli.  There  are no filling defects within the main, lobar, segmental or visualized  subsegmental pulmonary arteries. The pulmonary arteries are within  normal limits for size.      Aorta and great vessels: The aorta is well opacified and demonstrates no  dissection or aneurysm. The great vessels are normal in appearance.     Visualized neck base: The imaged portion of the base of the neck appears  unremarkable.      Lungs: The lungs are clear, except for a 7 mm partly solid nodule in the  LEFT upper lobe on axial image 28. The trachea and bronchial tree are  patent.      Heart: The heart is normal in size. There is no pericardial effusion.      Mediastinum and lymph nodes: No enlarged mediastinal, hilar, or axillary  lymph nodes are present.      Skeletal and soft tissues: The osseous structures of the thorax and  surrounding soft tissues demonstrate no acute process.     Upper abdomen: The imaged portion of the upper abdomen demonstrates no  acute process.        Impression:       1. No evidence of pulmonary embolus or other acute cardiopulmonary  process.  2. A few bronchial secretions are noted. There is mild peribronchial  thickening. Findings are likely due to bronchitis.  3. There is a partly solid nodule in the LEFT upper lobe that should be  followed per the Fleischner criteria.     Fleischner Society Recommendations for Management of PARTLY SOLID  Pulmonary nodules:     SOLITARY NODULES:  1. For partly solid nodules measuring less than 6 mm in diameter, no  follow-up is needed.  2. For partly solid nodules measuring greater than 6 mm, CT in 3 to 6  months to confirm persistence. If unchanged and a solid component is  below 6 mm, CT annually for 5 years. (Persistent, partly solid nodules  containing a solid component greater than 6 mm are highly suspicious).        This report was finalized on 12/17/2017 13:19 by Dr. Robb Garcia MD.          ECG/EMG Results (last 72 hours)     Procedure Component Value Units  Date/Time    SCANNED EKG [[id number]] Resulted:  12/17/17      Updated:  12/17/17 1936    SCANNED EKG [[id number]] Resulted:  12/17/17      Updated:  12/17/17 2139    ECG 12 Lead [[id number]] Collected:  12/17/17 1841     Updated:  12/18/17 0739    Narrative:       Test Reason : increased troponin  Blood Pressure : **/** mmHG  Vent. Rate : 071 BPM     Atrial Rate : 071 BPM     P-R Int : 168 ms          QRS Dur : 072 ms      QT Int : 430 ms       P-R-T Axes : 041 -21 048 degrees     QTc Int : 467 ms    Normal sinus rhythm  Low voltage QRS, consider pulmonary disease, pericardial effusion, or  normal variant  Nonspecific ST abnormality  Abnormal ECG  When compared with ECG of 17-DEC-2017 09:39,  Premature ventricular complexes are no longer Present    Referred By:  AUBREY           Confirmed By:PETER Lowery MD    ECG 12 Lead [[id number]] Collected:  12/17/17 0939     Updated:  12/18/17 0741    Narrative:       Test Reason : chest pain  Blood Pressure : **/** mmHG  Vent. Rate : 083 BPM     Atrial Rate : 083 BPM     P-R Int : 162 ms          QRS Dur : 078 ms      QT Int : 376 ms       P-R-T Axes : 053 -17 070 degrees     QTc Int : 441 ms    Sinus rhythm with frequent Premature ventricular complexes  Low voltage QRS, consider pulmonary disease, pericardial effusion, or  normal variant  Septal infarct (cited on or before 17-DEC-2017)  Abnormal ECG  When compared with ECG of 09-DEC-2017 10:06,  No significant change was found    Referred By:  EAN           Confirmed By:PETER Lowery MD    Adult Transthoracic Echo Complete W/ Cont if Necessary Per Protocol [[id number]] Collected:  12/18/17 0814     Updated:  12/18/17 0901     BSA 2.3 m^2      IVSd 1.0 cm      LVIDd 4.9 cm      LVIDs 2.9 cm      LVPWd 1.3 cm      IVS/LVPW 0.77     FS 40.8 %      EDV(Teich) 112.8 ml      ESV(Teich) 32.2 ml      EF(Teich) 71.4 %      EDV(cubed) 117.6 ml      ESV(cubed) 24.4 ml      EF(cubed) 79.3 %      LV mass(C)d 213.3 grams      LV  mass(C)dI 91.9 grams/m^2      SV(Teich) 80.6 ml      SI(Teich) 34.7 ml/m^2      SV(cubed) 93.3 ml      SI(cubed) 40.2 ml/m^2      Ao root diam 3.6 cm      Ao root area 10.2 cm^2      LA dimension 2.5 cm      LA/Ao 0.69     LVOT diam 2.0 cm      LVOT area 3.1 cm^2      LVOT area(traced) 3.1 cm^2      Ao root area (BSA corrected) 1.6     MV E max el 115.0 cm/sec      MV A max el 93.0 cm/sec      MV E/A 1.2     MV dec time 0.19 sec      Ao pk el 145.0 cm/sec      Ao max PG 8.4 mmHg      Ao max PG (full) 3.0 mmHg      Ao V2 mean 109.0 cm/sec      Ao mean PG 5.0 mmHg      Ao mean PG (full) 2.0 mmHg      Ao V2 VTI 37.8 cm      LUIGI(I,A) 2.5 cm^2      LUIGI(I,D) 2.5 cm^2      LUIGI(V,A) 2.5 cm^2      LUIGI(V,D) 2.5 cm^2      LV V1 max PG 5.4 mmHg      LV V1 mean PG 3.0 mmHg      LV V1 max 116.0 cm/sec      LV V1 mean 81.8 cm/sec      LV V1 VTI 29.6 cm      SV(Ao) 384.8 ml      SI(Ao) 165.8 ml/m^2      SV(LVOT) 93.0 ml      SI(LVOT) 40.1 ml/m^2      TR max el 125.0 cm/sec      RVSP(TR) 11.3 mmHg      RAP systole 5.0 mmHg       CV ECHO LIZZ - BZI_BMI 48.4 kilograms/m^2       CV ECHO LIZZ - BSA(HAYCOCK) 2.5 m^2       CV ECHO LIZZ - BZI_METRIC_WEIGHT 132.0 kg       CV ECHO LIZZ - BZI_METRIC_HEIGHT 165.1 cm      LA volume 49.0 cm3      E/E' ratio 18.9     LA Volume Index 21.1 mL/m2           Orders (last 72 hrs)     Start     Ordered    12/18/17 0900  ticagrelor (BRILINTA) tablet 90 mg  2 Times Daily      12/17/17 2158 12/18/17 0900  enoxaparin (LOVENOX) syringe 40 mg  Daily      12/18/17 0519    12/18/17 0800  Adult Transthoracic Echo Complete W/ Cont if Necessary Per Protocol  Once      12/17/17 2159 12/18/17 0700  ECG 12 Lead  Once      12/17/17 2158 12/18/17 0600  pantoprazole (PROTONIX) EC tablet 40 mg  Every Early Morning      12/17/17 1507    12/18/17 0600  Basic Metabolic Panel  Morning Draw      12/17/17 1506    12/18/17 0600  CBC (No Diff)  Morning Draw      12/17/17 1506    12/18/17 0600  CBC (No Diff)   Morning Draw,   Status:  Canceled      12/17/17 2158 12/18/17 0600  Basic Metabolic Panel  Morning Draw,   Status:  Canceled      12/17/17 2158 12/18/17 0600  Hemoglobin A1c  Morning Draw,   Status:  Canceled      12/17/17 2158 12/18/17 0600  Lipid Panel  Morning Draw     Comments:  Fasting    12/17/17 2158 12/18/17 0000  Strict intake and output  Every 4 Hours      12/17/17 2158 12/17/17 2230  fluconazole (DIFLUCAN) tablet 150 mg  Once      12/17/17 2158 12/17/17 2230  sodium chloride 0.9 % infusion  Continuous      12/17/17 2158 12/17/17 2200  Incentive Spirometry  Every 2 Hours While Awake      12/17/17 2158 12/17/17 2159  Obtain STAT EKG during chest pain. Notify MD of any change in rhythm, ST segments or complaints of chest pain.  Until Discontinued      12/17/17 2158 12/17/17 2159  Encourage fluids  Until Discontinued      12/17/17 2158 12/17/17 2159  Verify Discontinuation of enoxaparin (LOVENOX) and / or heparin  Once      12/17/17 2158 12/17/17 2159  Notify MD if platelet count is less than 100,000, is less than 1/2 baseline, or if Hgb drops by more than 3mg/dl.  Until Discontinued      12/17/17 2158 12/17/17 2159  Notify MD of hypotension (SBP less than 95), bleeding, or dysrythmia and follow Sheath Removal Policy if needed.  Continuous      12/17/17 2158 12/17/17 2159  Oxygen Therapy- Nasal Cannula; Titrate for SPO2: equal to or greater than, 92%, per policy  Continuous      12/17/17 2158 12/17/17 2159  Continuous Pulse Oximetry  Continuous      12/17/17 2158 12/17/17 2159  Advance Diet as Tolerated  Until Discontinued      12/17/17 2158 12/17/17 2159  Cardiac Rehab Evaluation  Once     Provider:  (Not yet assigned)    12/17/17 2158 12/17/17 2159  Closure Device Used  Once      12/17/17 2158 12/17/17 2159  Assess Puncture Site, Vital Signs, Distal Pulses & Observe Site for Bleeding or Swelling  Per Hospital Policy     Comments:  Every 15 Minutes x4,  Every 30 Minutes x4, & Every 1 Hour x2    12/17/17 2158 12/17/17 2159  Post Sheath Removal - Keep Flat in Bed With Affected Extremity Straight, HOB May be Elevated 30 Degrees or Less After:  Until Discontinued      12/17/17 2158 12/17/17 2159  Diet Regular; Consistent Carbohydrate, Cardiac  Diet Effective Now      12/17/17 2158 12/17/17 2158  potassium chloride (MICRO-K) CR capsule 20 mEq  Daily PRN      12/17/17 2158 12/17/17 2148  iopamidol (ISOVUE-370) 76 % injection  As Needed,   Status:  Discontinued      12/17/17 2148 12/17/17 2141  ticagrelor (BRILINTA) tablet  As Needed,   Status:  Discontinued      12/17/17 2141 12/17/17 2138  nitroglycerin (TRIDIL) injection  As Needed,   Status:  Discontinued      12/17/17 2140 12/17/17 2133  midazolam (VERSED) injection  As Needed,   Status:  Discontinued      12/17/17 2133 12/17/17 2127  enoxaparin (LOVENOX) syringe  As Needed,   Status:  Discontinued      12/17/17 2127 12/17/17 2116  FentaNYL Citrate (PF) (SUBLIMAZE) injection  As Needed,   Status:  Discontinued      12/17/17 2116 12/17/17 2114  lidocaine (XYLOCAINE) 2% injection  As Needed,   Status:  Discontinued      12/17/17 2114 12/17/17 2114  P2Y12 Platelet Inhibition  STAT      12/17/17 2113 12/17/17 2103  diphenhydrAMINE (BENADRYL) injection  As Needed,   Status:  Discontinued      12/17/17 2104 12/17/17 2100  atorvastatin (LIPITOR) tablet 80 mg  Nightly      12/17/17 1507    12/17/17 2100  carvedilol (COREG) tablet 3.125 mg  Every 12 Hours Scheduled      12/17/17 1507    12/17/17 2048  Cardiac Catheterization/Vascular Study  Once      12/17/17 2047 12/17/17 2015  sodium chloride 0.9 % infusion  Continuous      12/17/17 1944 12/17/17 1944  CBC & Differential  STAT      12/17/17 1944 12/17/17 1944  aPTT  STAT      12/17/17 1944 12/17/17 1944  Protime-INR  STAT      12/17/17 1944 12/17/17 1944  CBC Auto Differential  PROCEDURE ONCE      12/17/17 1944     12/17/17 1942  P2Y12 Platelet Inhibition  Once,   Status:  Canceled      12/17/17 1941    12/17/17 1835  ECG 12 Lead  Once      12/17/17 1835    12/17/17 1815  LDL Cholesterol, Direct  Once,   Status:  Canceled      12/17/17 1814    12/17/17 1800  glycopyrrolate (ROBINUL) tablet 1 mg  4 Times Daily      12/17/17 1507    12/17/17 1800  Troponin  Every 6 Hours     Comments:  Please draw 6 hours from last draw in the ED.    12/17/17 1506    12/17/17 1600  Vital Signs  Every 4 Hours      12/17/17 1506    12/17/17 1600  Strict Intake and Output  Every Hour      12/17/17 1506    12/17/17 1600  enoxaparin (LOVENOX) syringe 120 mg  Every 12 Hours Scheduled,   Status:  Discontinued     Comments:  No warfarin reported PTA    12/17/17 1506    12/17/17 1600  pregabalin (LYRICA) capsule 150 mg  3 Times Daily      12/17/17 1241    12/17/17 1600  nicotine (NICODERM CQ) 14 MG/24HR patch 1 patch  Every 24 Hours Scheduled      12/17/17 1506    12/17/17 1545  amLODIPine (NORVASC) tablet 2.5 mg  Daily      12/17/17 1507    12/17/17 1545  aspirin chewable tablet 81 mg  Daily      12/17/17 1507    12/17/17 1545  clopidogrel (PLAVIX) tablet 75 mg  Daily,   Status:  Discontinued      12/17/17 1507    12/17/17 1545  lisinopril (PRINIVIL,ZESTRIL) tablet 20 mg  Daily      12/17/17 1507    12/17/17 1543  Lipid Panel  Morning Draw      12/17/17 1506    12/17/17 1513  albuterol (PROVENTIL) nebulizer solution 0.083% 2.5 mg/3mL  Every 4 Hours PRN      12/17/17 1513    12/17/17 1507  US Venous Doppler Lower Extremity Bilateral (duplex)  1 Time Imaging,   Status:  Canceled      12/17/17 1506    12/17/17 1507  US Arterial Doppler Lower Extremity Bilateral  1 Time Imaging,   Status:  Canceled      12/17/17 1506    12/17/17 1507  Adult Transthoracic Echo Complete W/ Cont if Necessary Per Protocol  Once,   Status:  Canceled      12/17/17 1506    12/17/17 1507  Weigh Patient  Once      12/17/17 1506    12/17/17 1507  Oxygen Therapy-  Continuous       12/17/17 1506    12/17/17 1507  Insert Peripheral IV  Once      12/17/17 1506    12/17/17 1507  Saline Lock & Maintain IV Access  Continuous      12/17/17 1506    12/17/17 1507  Full Code  Continuous      12/17/17 1506    12/17/17 1507  VTE Risk Assessment - High Risk  Once      12/17/17 1506    12/17/17 1507  Mechanical VTE Prophylaxis Not Indicated: Already Ordered in This Admission, Moderate VTE Risk With Pharmacologic Prophylaxis  Once      12/17/17 1506    12/17/17 1507  NPO Diet  Diet Effective Now,   Status:  Canceled      12/17/17 1506    12/17/17 1507  Urine Drug Screen - Urine, Clean Catch  Once      12/17/17 1506    12/17/17 1507  Hemoglobin A1c  Morning Draw      12/17/17 1506    12/17/17 1506  albuterol (PROVENTIL HFA;VENTOLIN HFA) inhaler 2 puff  Every 4 Hours PRN,   Status:  Discontinued      12/17/17 1507    12/17/17 1506  bumetanide (BUMEX) tablet 1 mg  Daily PRN      12/17/17 1507    12/17/17 1506  cyclobenzaprine (FLEXERIL) tablet 10 mg  3 Times Daily PRN      12/17/17 1507    12/17/17 1506  sodium chloride 0.9 % flush 1-10 mL  As Needed      12/17/17 1506    12/17/17 1503  Inpatient Consult to Case Management   Once     Provider:  (Not yet assigned)    12/17/17 1505    12/17/17 1417  US Arterial Doppler Lower Extremity Complete  1 Time Imaging      12/17/17 1416    12/17/17 1416  US Venous Doppler Lower Extremity Bilateral (duplex)  1 Time Imaging      12/17/17 1416    12/17/17 1311  iopamidol (ISOVUE-370) 76 % injection 150 mL  Once in Imaging      12/17/17 1309    12/17/17 1241  LORazepam (ATIVAN) tablet 0.5 mg  2 Times Daily PRN      12/17/17 1241    12/17/17 1241  Initiate Observation Status  Once      12/17/17 1241    12/17/17 1211  Inpatient Admission  Once      12/17/17 1211    12/17/17 1050  CT Angiogram Chest With Contrast  1 Time Imaging      12/17/17 1049    12/17/17 0956  [place] Draw  Once      12/17/17 0955    12/17/17 0956  Lavender Top  PROCEDURE ONCE      12/17/17  0955    17 0956  Red Top  PROCEDURE ONCE      17 0955    17 0937  ECG 12 Lead  Once      17 0936    17 0937  Troponin  Once      17 0936    17 0937  D-dimer, Quantitative  Once      1736    Unscheduled  Vital Signs  As Needed      17    Unscheduled  Check distal extremity for warmth, color, sensation and pulses with each vital sign and site check.  As Needed      17    Unscheduled  Change site dressing  As Needed      17    --  potassium chloride (K-DUR,KLOR-CON) 20 MEQ CR tablet  Daily PRN      17 1843    --  SCANNED EKG      17 0000    --  SCANNED EKG      17 0000    --  SCANNED - TELEMETRY        17 0000          Physician Progress Notes (last 72 hours) (Notes from 12/15/2017 10:51 AM through 2017 10:51 AM)     No notes of this type exist for this encounter.        Consult Notes (last 72 hours) (Notes from 12/15/2017 10:51 AM through 2017 10:51 AM)     No notes of this type exist for this encounter.          [facility] CATH LAB  [address] [place] [id number] [phone]             Patient Information   Patient Name MRN Sex  (Age)   Serene Cr [id number] Female 1970 (47 y.o.)   Race Ethnicity Encounter Category   White or  Not  or  Emergency   Procedures   Left Heart Cath   Percutaneous Coronary Intervention   Percutaneous Mechanical Thrombectomy    Performed Date   Dec 17, 2017      Physicians   Panel Physicians Referring Physician Case Authorizing Physician   Clinton Reddy MD (Primary)         Conclusion   Cardiac Catheterization Operative Report     Serene Cr  [id number]  2017     Patient was referred for cardiac catheterization       Indications for the procedure include: Symptoms suggestive of angina with high suspicion for significant obstructive coronary artery disease   Non ST elevation myocardial infarction          Procedure performed  Left heart cath  Coronary angiography  Right femoral arteriography  Insertion of 6 Fr Mynx hemostatic closure device with effective hemostasis and preserved right lower extremity pulses  Left ventriculography  Supervision of the administration of moderate sedation  PTCA of left circumflex coronary artery in the mid aspect at site of stent thrombosis.  Aspiration thrombectomy.     Anticoagulation heparin 30 mg intravenously and 1 mg/kg subcutaneously given the less than 8 hours ago.        Procedure Details  The risks, benefits, complications, treatment options, and expected outcomes were discussed with the patient. The patient and/or family concurred with the proposed plan, giving informed consent. Patient was brought to the cath lab after IV hydration was begun and oral premedication was given. He was further sedated with fentanyl and midazolam.The patient was prepped and draped in the usual manner. Using the modified Seldinger access technique, a 6f Indonesian sheath was placed in the femoral artery.        A left heart catheterization was performed.     Angiograms were also obtained.  Cardiac Catheterization Operative Report        Patient was referred for cardiac catheterization . Indications for the procedure include: abnormal stress test, chest pain, shortness of breath.      Procedure Details  The risks, benefits, complications, treatment options, and expected outcomes were discussed with the patient. The patient and/or family concurred with the proposed plan, giving informed consent. Patient was brought to the cath lab after IV hydration was begun and oral premedication was given.      The skin overlying the patient's right femoral artery was prepped and draped in the usual sterile fashion.  Timeout was taken to confirm the correct patient and procedure.  Lidocaine was administered for local anesthesia.  IV Versed and fentanyl were used to achieve conscious sedation.  Modified  Seldinger technique was then used to place a 7 [place]ian sheath in the left femoral artery     Diagnostic coronary angiography was performed with 7 [place]ian XB 3. 5 and 5 [place]ian AR-1 coronary catheters 4 left circumflex coronary intervention and right coronary angiography respectively..  Coronary angiogram were performed in Slovenian and HENDRIX projection to evaluate the coronary arterial systems.  A left heart catheterization was done.          Before all coronary angiograms and LV gram and Left heart pressure measurements were obtained, a femoral angiogram was performed and the arteriotomy was suitable for a closure device.  A 6Fr Mynx closure device was used to achieve hemostasis.  The patient tolerated the procedure well, and there were no immediate complications.     Procedural Details: After written and informed consent was obtained, the patient was brought to the cath lab in a fasting state.  Results:      Selective coronary angiography:     Left main coronary artery:  The left main coronary artery arises from the left coronary cusp and bifurcates into the  left anterior descending coronary artery  and left circumflex arteries.       Left main coronary artery is normal     Left anterior descending artery:  The  left anterior descending coronary artery   arises normally from the left main coronary artery and courses in the anterior interventricular groove and terminates at the apex.  Midportion has approximately a 50% stenosis.     Left circumflex:  The left circumflex arises form the left man artery and supplies obtuse marginal branches.Left circumflex artery  is codominant and occluded in the midportion at the site of the earlier implanted stent diagnostic of acute stent thrombosis.  This was treated with the serial balloon angioplasty with establishment of EMMY 3 flow after aspiration thrombectomy.  Intracoronary nitroglycerin was given.     Right coronary artery:  The RCA arises normally from the right coronary cusp and  is dominant for the posterior circulation.  The RCA is dominant and similar stenosis as on cardiac catheterization in the mid to distal aspect.  Nonselective injection was done the period to not easy to engage this vessel.         Left heart cath: LVEDP   26     mm Hg with no gradient across aortic valve on pullback.      LV Gram in Normal LVEF 55% with no significant mitral regurgitation.     Interventions: As described if any     Estimated Blood Loss:  Minimal      Complications:  None; patient tolerated the procedure well.      Disposition: Cardiovascular observation unit         Condition: stable               I supervised the administration of conscious sedation by nursing staff throughout the case.       First dose was given at          2112           and the end of my face-to-face encounter was at       2149            Hours.          During the case, continuous pulse oximetry, heart rate, blood pressure, and patient status were monitored.               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  The initial occlusion 100% postprocedure 0%.  Chest pain resolved completely after procedure.  Right coronary angiography nonselectively done and shows patent vessel likely with continued stenosis as she had the less than 10 days ago.     Normal LV EF     LVEDP   26    mm Hg           Plan     Dual antiplatelet therapy minimum of 1 year.  The highly recommend discontinuing marijuana use and taking her Plavix regularly.Brilinta 180 mg today and 90 mg by mouth twice a day for 1 year.  Make sure she can afford afford this prior to discharge.  Intensive risk factor modifications for both primary and secondary prevention if applicable  Hydration

## 2017-12-18 NOTE — PROGRESS NOTES
"[facility] -  Progress Note     LOS: 1 day   Patient Care Team:  Patrick Redd MD as PCP - General  John E Broadbent, MD as Referring Physician (Cardiology)  Clinton Reddy MD as Cardiologist (Cardiology)    Chief Complaint: Chest pain    Subjective     Interval History: Heart cath yesterday revealed acute stent thrombosis of mid-left circumflex artery treated with serial percutaneous coronary angioplasty. Procedure was tolerated well without obvious complications. Troponin peaked at 92 and has trended down to 62.6. Pt is doing well today. Denies chest pain, shortness of breath, palpitations, dizziness, syncope, orthopnea, PND or swelling. 2D echo, bilateral lower extremity venous ultrasound and bilateral lower extremity arterial ultrasound results pending. Right DP and PT pulses present with doppler only.     Patient Complaints: None. \"Ready to go home.\"        Review of Systems:     Review of Systems   Constitutional: Negative for chills, fatigue and fever.   HENT: Negative.    Eyes: Negative.    Respiratory: Negative for cough, chest tightness, shortness of breath, wheezing and stridor.    Cardiovascular: Negative for chest pain, palpitations and leg swelling.   Gastrointestinal: Negative for abdominal distention, abdominal pain, blood in stool, constipation, diarrhea, nausea and vomiting.   Endocrine: Negative.    Genitourinary: Negative for difficulty urinating, dysuria, flank pain and hematuria.   Musculoskeletal: Negative.    Skin: Negative for rash and wound.   Allergic/Immunologic: Negative.    Neurological: Negative for dizziness, syncope, weakness, light-headedness and headaches.   Hematological: Does not bruise/bleed easily.   Psychiatric/Behavioral: Negative for agitation, behavioral problems, confusion, hallucinations, sleep disturbance and suicidal ideas. The patient is not nervous/anxious.      Objective     Vital Sign Min/Max for last 24 hours  Temp  Min: 97.3 °F (36.3 °C)  " Max: 98.6 °F (37 °C)   BP  Min: 90/57  Max: 140/97   Pulse  Min: 64  Max: 85   Resp  Min: 14  Max: 20   SpO2  Min: 89 %  Max: 99 %   Flow (L/min)  Min: 2  Max: 3   Weight  Min: 132 kg (291 lb 1.6 oz)  Max: 132 kg (291 lb 1.6 oz)     Last Weight    12/17/17  1444   Weight: 132 kg (291 lb 1.6 oz) (bed zeroed before pt got into bed)       Physical Exam:    Physical Exam   Constitutional: She is oriented to person, place, and time. Vital signs are normal. She appears well-developed and well-nourished. No distress.   HENT:   Head: Normocephalic and atraumatic.   Right Ear: External ear normal.   Left Ear: External ear normal.   Nose: Nose normal.   Eyes: Conjunctivae are normal. Pupils are equal, round, and reactive to light. Right eye exhibits no discharge. Left eye exhibits no discharge.   Neck: Normal range of motion. Neck supple. No JVD present. Carotid bruit is not present. No tracheal deviation present. No thyromegaly present.   Cardiovascular: Normal rate, regular rhythm, normal heart sounds and intact distal pulses.  PMI is not displaced.  Exam reveals no gallop and no friction rub.    No murmur heard.  Pulses:       Radial pulses are 2+ on the right side, and 2+ on the left side.        Dorsalis pedis pulses are 1+ on the right side, and 2+ on the left side.        Posterior tibial pulses are 1+ on the right side, and 2+ on the left side.   Pulmonary/Chest: Effort normal and breath sounds normal. No respiratory distress. She has no decreased breath sounds. She has no wheezes. She has no rhonchi. She has no rales. She exhibits no tenderness.   Abdominal: Soft. She exhibits no distension. There is no tenderness.   Musculoskeletal: Normal range of motion. She exhibits no edema, tenderness or deformity.   Neurological: She is alert and oriented to person, place, and time.   Skin: Skin is warm and dry. No rash noted. She is not diaphoretic. No erythema. No pallor.        Psychiatric: She has a normal mood and affect.  Her behavior is normal. Judgment and thought content normal.   Vitals reviewed.    Results Review:   Lab Results (last 72 hours)     Procedure Component Value Units Date/Time    D-dimer, Quantitative [[id number]]  (Abnormal) Collected:  12/17/17 0955    Specimen:  Blood Updated:  12/17/17 1022     D-Dimer, Quantitative 0.67 (H) mg/L (FEU)     Narrative:       Reference Range is 0-0.50 mg/L FEU. However, results <0.50 mg/L FEU tends to rule out DVT or PE. Results >0.50 mg/L FEU are not useful in predicting absence or presence of DVT or PE.    Troponin [[id number]]  (Abnormal) Collected:  12/17/17 0955    Specimen:  Blood Updated:  12/17/17 1114     Troponin I 1.220 (C) ng/mL     Hemoglobin A1c [[id number]] Collected:  12/17/17 1511    Specimen:  Blood Updated:  12/17/17 1545     Hemoglobin A1C 6.4 %     Narrative:       Less than 6.0           Non-Diabetic Range  6.0-7.0                 ADA Therapeutic Target  Greater than 7.0        Action Suggested    Lipid Panel [[id number]]  (Abnormal) Collected:  12/17/17 0955    Specimen:  Blood Updated:  12/17/17 1602     Total Cholesterol 221 (H) mg/dL      Triglycerides 265 (H) mg/dL      HDL Cholesterol 42 (L) mg/dL      LDL Cholesterol  146 (H) mg/dL      LDL/HDL Ratio 3.00    Troponin [[id number]]  (Abnormal) Collected:  12/17/17 1747    Specimen:  Blood Updated:  12/17/17 1826     Troponin I 26.200 (C) ng/mL     Urine Drug Screen - Urine, Clean Catch [[id number]]  (Abnormal) Collected:  12/17/17 1821    Specimen:  Urine from Urine, Clean Catch Updated:  12/17/17 1915     Amphetamine Screen, Urine Positive (A)     Barbiturates Screen, Urine Negative     Benzodiazepine Screen, Urine Negative     Cocaine Screen, Urine Negative     Methadone Screen, Urine Negative     Opiate Screen Positive (A)     Phencyclidine (PCP), Urine Negative     THC, Screen, Urine Positive (A)    Narrative:       Negative Thresholds For Drugs Screened in Urine:    Amphetamines          500  ng/ml  Barbiturates          200 ng/ml  Benzodiazepines       200 ng/ml  Cocaine               150 ng/ml  Methadone             150 ng/ml  Opiates               300 ng/ml  Phencyclidine         25 ng/ml  THC                      50 ng/ml    The normal value for all drugs tested is negative. This report includes final unconfirmed screening results.  A positive result by this assay can be, at your request, sent to the Reference Lab for confirmation by gas chromatography. Unconfirmed results must not be used for non-medical purposes, such as employment or legal testing. Clinical consideration should be applied to any drug of abuse test result, particularly when unconfirmed results are used.    P2Y12 Platelet Inhibition [[id number]]  (Abnormal) Collected:  12/17/17 2127    Specimen:  Blood Updated:  12/17/17 2144     Hematocrit 34.2 (L) %      Platelets 453 (H) 10*3/mm3      P2Y12 Reactivity Unit 271 PRU     Narrative:       PRU reference range 194-418 is for patients not receiving P2Y12 drug. Post Drug results: Lower PRU levels are associated with expected antiplatelet effect. Values may be below the stated reference range above. The post-drug PRU values reported are .        CBC & Differential [[id number]] Collected:  12/17/17 2248    Specimen:  Blood Updated:  12/17/17 2307    Narrative:       The following orders were created for panel order CBC & Differential.  Procedure                               Abnormality         Status                     ---------                               -----------         ------                     CBC Auto Differential[[id number]]        Abnormal            Final result                 Please view results for these tests on the individual orders.    CBC Auto Differential [[id number]]  (Abnormal) Collected:  12/17/17 2248    Specimen:  Blood Updated:  12/17/17 2307     WBC 20.43 (H) 10*3/mm3      RBC 4.09 (L) 10*6/mm3      Hemoglobin 11.5 (L) g/dL      Hematocrit 35.5 (L) %       MCV 86.8 fL      MCH 28.1 pg      MCHC 32.4 (L) g/dL      RDW 13.6 %      RDW-SD 43.4 fl      MPV 9.4 fL      Platelets 421 (H) 10*3/mm3      Neutrophil % 61.9 %      Lymphocyte % 26.9 %      Monocyte % 9.3 %      Eosinophil % 1.1 %      Basophil % 0.4 %      Immature Grans % 0.4 %      Neutrophils, Absolute 12.67 (H) 10*3/mm3      Lymphocytes, Absolute 5.49 (H) 10*3/mm3      Monocytes, Absolute 1.89 (H) 10*3/mm3      Eosinophils, Absolute 0.22 10*3/mm3      Basophils, Absolute 0.08 10*3/mm3      Immature Grans, Absolute 0.08 (H) 10*3/mm3      nRBC 0.0 /100 WBC     aPTT [[id number]]  (Abnormal) Collected:  12/17/17 2248    Specimen:  Blood Updated:  12/17/17 2315     PTT 45.4 (H) seconds     Protime-INR [[id number]]  (Normal) Collected:  12/17/17 2248    Specimen:  Blood Updated:  12/17/17 2315     Protime 14.0 Seconds      INR 1.05    CBC (No Diff) [[id number]]  (Abnormal) Collected:  12/18/17 0210    Specimen:  Blood Updated:  12/18/17 0231     WBC 16.30 (H) 10*3/mm3      RBC 4.12 (L) 10*6/mm3      Hemoglobin 11.6 (L) g/dL      Hematocrit 35.7 (L) %      MCV 86.7 fL      MCH 28.2 pg      MCHC 32.5 (L) g/dL      RDW 13.7 %      RDW-SD 43.3 fl      MPV 9.1 fL      Platelets 431 (H) 10*3/mm3     Basic Metabolic Panel [[id number]]  (Normal) Collected:  12/18/17 0210    Specimen:  Blood Updated:  12/18/17 0247     Glucose 100 mg/dL      BUN 16 mg/dL      Creatinine 0.92 mg/dL      Sodium 139 mmol/L      Potassium 3.7 mmol/L      Chloride 105 mmol/L      CO2 27.0 mmol/L      Calcium 8.4 mg/dL      eGFR Non African Amer 66 mL/min/1.73      BUN/Creatinine Ratio 17.4     Anion Gap 7.0 mmol/L     Narrative:       GFR Normal >60  Chronic Kidney Disease <60  Kidney Failure <15    Lipid Panel [[id number]]  (Abnormal) Collected:  12/18/17 0210    Specimen:  Blood Updated:  12/18/17 0255     Total Cholesterol 160 mg/dL      Triglycerides 358 (H) mg/dL      HDL Cholesterol 28 (L) mg/dL      LDL Cholesterol  103 (H) mg/dL      LDL/HDL  Ratio 2.16    Troponin [[id number]]  (Abnormal) Collected:  12/18/17 0210    Specimen:  Blood Updated:  12/18/17 0337     Troponin I 92.000 (C) ng/mL     Troponin [[id number]]  (Abnormal) Collected:  12/18/17 0626    Specimen:  Blood Updated:  12/18/17 0703     Troponin I 62.600 (C) ng/mL       Specimen hemolyzed.  Results may be affected.             Medication Review: yes  Current Facility-Administered Medications   Medication Dose Route Frequency Provider Last Rate Last Dose   • albuterol (PROVENTIL) nebulizer solution 0.083% 2.5 mg/3mL  2.5 mg Nebulization Q4H PRN Clinton Reddy MD       • amLODIPine (NORVASC) tablet 2.5 mg  2.5 mg Oral Daily Nona Fuentes PA-C   2.5 mg at 12/18/17 0927   • aspirin chewable tablet 81 mg  81 mg Oral Daily RYAN HarpC   81 mg at 12/18/17 0927   • atorvastatin (LIPITOR) tablet 80 mg  80 mg Oral Nightly RYAN HarpC   80 mg at 12/17/17 2224   • bumetanide (BUMEX) tablet 1 mg  1 mg Oral Daily PRN Nona Fuentes PA-C       • carvedilol (COREG) tablet 3.125 mg  3.125 mg Oral Q12H Nona Fuentes PA-C   3.125 mg at 12/18/17 0927   • cyclobenzaprine (FLEXERIL) tablet 10 mg  10 mg Oral TID PRN Nona Fuentes PA-C       • enoxaparin (LOVENOX) syringe 40 mg  40 mg Subcutaneous Daily Clinton Reddy MD   40 mg at 12/18/17 0927   • glycopyrrolate (ROBINUL) tablet 1 mg  1 mg Oral 4x Daily Nona Fuentes PA-C   1 mg at 12/18/17 1332   • lisinopril (PRINIVIL,ZESTRIL) tablet 20 mg  20 mg Oral Daily Nona Fuentes PA-C   20 mg at 12/18/17 0927   • LORazepam (ATIVAN) tablet 0.5 mg  0.5 mg Oral BID PRN Clinton Reddy MD   0.5 mg at 12/17/17 1316   • nicotine (NICODERM CQ) 14 MG/24HR patch 1 patch  1 patch Transdermal Q24H Nona Fuentes PA-C   1 patch at 12/18/17 0929   • pantoprazole (PROTONIX) EC tablet 40 mg  40 mg Oral Q AM Nona Fuentes PA-C   40 mg at 12/18/17 0606   • potassium chloride (MICRO-K) CR capsule 20 mEq  20 mEq Oral Daily PRN Clinton Reddy MD       • pregabalin (LYRICA)  capsule 150 mg  150 mg Oral TID Clinton Reddy MD   150 mg at 12/18/17 0927   • sodium chloride 0.9 % flush 1-10 mL  1-10 mL Intravenous PRN Nona Fuentes PA-C       • sodium chloride 0.9 % infusion  100 mL/hr Intravenous Continuous Clinton Reddy MD   Stopped at 12/17/17 2229   • sodium chloride 0.9 % infusion  100 mL/hr Intravenous Continuous Clinton Reddy  mL/hr at 12/18/17 0934 100 mL/hr at 12/18/17 0934   • ticagrelor (BRILINTA) tablet 90 mg  90 mg Oral BID Clinton Reddy MD   90 mg at 12/18/17 0929         Assessment/Plan       Principal Problem:    Chest pain  Active Problems:    ACS (acute coronary syndrome)    Coronary stent restenosis    Status post insertion of drug eluting coronary artery stent    Coronary artery disease    Mixed hyperlipidemia    Essential hypertension    TUSHAR (obstructive sleep apnea)    Class 3 obesity due to excess calories with serious comorbidity and body mass index (BMI) of 50.0 to 59.9 in adult    Tobacco abuse    Illicit drug use      Plan    1. Transfer to  telemetry floor.  2. Continue aspirin, brilinta, amlodipine, atorvastatin, carvedilol and lisinopril.  3. Continue cardiac diet.  4. CBC, BMP in am.   5. 2D echo results pending.  6. Bilateral lower extremity venous and arterial ultrasound results pending.     Abida Zelaya, APRN  12/18/17  2:33 PM

## 2017-12-19 VITALS
OXYGEN SATURATION: 99 % | HEIGHT: 65 IN | BODY MASS INDEX: 48.82 KG/M2 | DIASTOLIC BLOOD PRESSURE: 75 MMHG | RESPIRATION RATE: 18 BRPM | TEMPERATURE: 97.8 F | HEART RATE: 83 BPM | SYSTOLIC BLOOD PRESSURE: 117 MMHG | WEIGHT: 293 LBS

## 2017-12-19 LAB
BH CV ECHO MEAS - AO MAX PG (FULL): 3 MMHG
BH CV ECHO MEAS - AO MAX PG: 8.4 MMHG
BH CV ECHO MEAS - AO MEAN PG (FULL): 2 MMHG
BH CV ECHO MEAS - AO MEAN PG: 5 MMHG
BH CV ECHO MEAS - AO ROOT AREA (BSA CORRECTED): 1.6
BH CV ECHO MEAS - AO ROOT AREA: 10.2 CM^2
BH CV ECHO MEAS - AO ROOT DIAM: 3.6 CM
BH CV ECHO MEAS - AO V2 MAX: 145 CM/SEC
BH CV ECHO MEAS - AO V2 MEAN: 109 CM/SEC
BH CV ECHO MEAS - AO V2 VTI: 37.8 CM
BH CV ECHO MEAS - AVA(I,A): 2.5 CM^2
BH CV ECHO MEAS - AVA(I,D): 2.5 CM^2
BH CV ECHO MEAS - AVA(V,A): 2.5 CM^2
BH CV ECHO MEAS - AVA(V,D): 2.5 CM^2
BH CV ECHO MEAS - BSA(HAYCOCK): 2.5 M^2
BH CV ECHO MEAS - BSA: 2.3 M^2
BH CV ECHO MEAS - BZI_BMI: 48.4 KILOGRAMS/M^2
BH CV ECHO MEAS - BZI_METRIC_HEIGHT: 165.1 CM
BH CV ECHO MEAS - BZI_METRIC_WEIGHT: 132 KG
BH CV ECHO MEAS - EDV(CUBED): 117.6 ML
BH CV ECHO MEAS - EDV(TEICH): 112.8 ML
BH CV ECHO MEAS - EF(CUBED): 79.3 %
BH CV ECHO MEAS - EF(TEICH): 71.4 %
BH CV ECHO MEAS - ESV(CUBED): 24.4 ML
BH CV ECHO MEAS - ESV(TEICH): 32.2 ML
BH CV ECHO MEAS - FS: 40.8 %
BH CV ECHO MEAS - IVS/LVPW: 0.77
BH CV ECHO MEAS - IVSD: 1 CM
BH CV ECHO MEAS - LA DIMENSION: 2.5 CM
BH CV ECHO MEAS - LA/AO: 0.69
BH CV ECHO MEAS - LV MASS(C)D: 213.3 GRAMS
BH CV ECHO MEAS - LV MASS(C)DI: 91.9 GRAMS/M^2
BH CV ECHO MEAS - LV MAX PG: 5.4 MMHG
BH CV ECHO MEAS - LV MEAN PG: 3 MMHG
BH CV ECHO MEAS - LV V1 MAX: 116 CM/SEC
BH CV ECHO MEAS - LV V1 MEAN: 81.8 CM/SEC
BH CV ECHO MEAS - LV V1 VTI: 29.6 CM
BH CV ECHO MEAS - LVIDD: 4.9 CM
BH CV ECHO MEAS - LVIDS: 2.9 CM
BH CV ECHO MEAS - LVOT AREA (M): 3.1 CM^2
BH CV ECHO MEAS - LVOT AREA: 3.1 CM^2
BH CV ECHO MEAS - LVOT DIAM: 2 CM
BH CV ECHO MEAS - LVPWD: 1.3 CM
BH CV ECHO MEAS - MV A MAX VEL: 93 CM/SEC
BH CV ECHO MEAS - MV DEC TIME: 0.19 SEC
BH CV ECHO MEAS - MV E MAX VEL: 115 CM/SEC
BH CV ECHO MEAS - MV E/A: 1.2
BH CV ECHO MEAS - RAP SYSTOLE: 5 MMHG
BH CV ECHO MEAS - RVSP: 11.3 MMHG
BH CV ECHO MEAS - SI(AO): 165.8 ML/M^2
BH CV ECHO MEAS - SI(CUBED): 40.2 ML/M^2
BH CV ECHO MEAS - SI(LVOT): 40.1 ML/M^2
BH CV ECHO MEAS - SI(TEICH): 34.7 ML/M^2
BH CV ECHO MEAS - SV(AO): 384.8 ML
BH CV ECHO MEAS - SV(CUBED): 93.3 ML
BH CV ECHO MEAS - SV(LVOT): 93 ML
BH CV ECHO MEAS - SV(TEICH): 80.6 ML
BH CV ECHO MEAS - TR MAX VEL: 125 CM/SEC
E/E' RATIO: 18.9
LEFT ATRIUM VOLUME INDEX: 21.1 ML/M2
LEFT ATRIUM VOLUME: 49 CM3
LV EF 2D ECHO EST: 55 %

## 2017-12-19 PROCEDURE — 99239 HOSP IP/OBS DSCHRG MGMT >30: CPT | Performed by: INTERNAL MEDICINE

## 2017-12-19 PROCEDURE — 25010000002 ENOXAPARIN PER 10 MG: Performed by: INTERNAL MEDICINE

## 2017-12-19 RX ORDER — NICOTINE 21 MG/24HR
1 PATCH, TRANSDERMAL 24 HOURS TRANSDERMAL
Qty: 21 PATCH | Refills: 1 | Status: SHIPPED | OUTPATIENT
Start: 2017-12-20

## 2017-12-19 RX ADMIN — GLYCOPYRROLATE 1 MG: 1 TABLET ORAL at 12:01

## 2017-12-19 RX ADMIN — AMLODIPINE BESYLATE 2.5 MG: 5 TABLET ORAL at 10:16

## 2017-12-19 RX ADMIN — ASPIRIN 81 MG 81 MG: 81 TABLET ORAL at 10:16

## 2017-12-19 RX ADMIN — GLYCOPYRROLATE 1 MG: 1 TABLET ORAL at 10:16

## 2017-12-19 RX ADMIN — PANTOPRAZOLE SODIUM 40 MG: 40 TABLET, DELAYED RELEASE ORAL at 06:20

## 2017-12-19 RX ADMIN — LISINOPRIL 20 MG: 20 TABLET ORAL at 10:14

## 2017-12-19 RX ADMIN — ENOXAPARIN SODIUM 40 MG: 40 INJECTION SUBCUTANEOUS at 10:15

## 2017-12-19 RX ADMIN — PREGABALIN 150 MG: 75 CAPSULE ORAL at 15:41

## 2017-12-19 RX ADMIN — PREGABALIN 150 MG: 75 CAPSULE ORAL at 10:15

## 2017-12-19 RX ADMIN — TICAGRELOR 90 MG: 90 TABLET ORAL at 10:15

## 2017-12-19 RX ADMIN — CARVEDILOL 3.12 MG: 3.12 TABLET, FILM COATED ORAL at 10:15

## 2017-12-19 RX ADMIN — NICOTINE 1 PATCH: 14 PATCH, EXTENDED RELEASE TRANSDERMAL at 10:16

## 2017-12-19 NOTE — PLAN OF CARE
Problem: Patient Care Overview (Adult)  Goal: Plan of Care Review  Outcome: Ongoing (interventions implemented as appropriate)    12/18/17 1807 12/18/17 2232 12/19/17 0220   Coping/Psychosocial Response Interventions   Plan Of Care Reviewed With --  patient --    Patient Care Overview   Progress progress toward functional goals as expected --  --    Outcome Evaluation   Outcome Summary/Follow up Plan --  --  VSS. Pt has had no c/o pain. L groin C/D/I. Will continue to monitor and notify MD of changes.       Goal: Adult Individualization and Mutuality  Outcome: Ongoing (interventions implemented as appropriate)    Problem: Acute Coronary Syndrome (ACS) (Adult)  Goal: Signs and Symptoms of Listed Potential Problems Will be Absent or Manageable (Acute Coronary Syndrome)  Outcome: Ongoing (interventions implemented as appropriate)    Problem: Fall Risk (Adult)  Goal: Identify Related Risk Factors and Signs and Symptoms  Outcome: Ongoing (interventions implemented as appropriate)  Goal: Absence of Falls  Outcome: Ongoing (interventions implemented as appropriate)    Problem: Pain, Chronic (Adult)  Goal: Identify Related Risk Factors and Signs and Symptoms  Outcome: Ongoing (interventions implemented as appropriate)  Goal: Acceptable Pain Control/Comfort Level  Outcome: Ongoing (interventions implemented as appropriate)

## 2017-12-19 NOTE — DISCHARGE INSTRUCTIONS
Activity: no driving x 2 days, no tub baths or submersion in water x 5 days, no heavy lifting > 5 lbs or repetitive bending at groin site x 1-2 weeks  Diet: Cardiac diet  Medications: Take all medications as prescribed. It is IMPERATIVE to take both aspirin and brilinta daily without any missed doses to prevent stent from clotting, with risks including heart attack, up to point of death. Please check on brilinta price (by calling insurance company) well before samples are complete; call if this is to expensive.   Follow-up: Follow-up with primary care provider in 1 week, Dr. Mckeon as scheduled, Dr. Reddy in 4 weeks.  Other: Check groin site for signs of infection, including drainage, redness, tenderness, fever; call if any occur. Please stop drug use and tobacco use.

## 2017-12-19 NOTE — DISCHARGE SUMMARY
[facility] DISCHARGE    Date of Discharge:  12/19/2017    Discharge Diagnosis: acute stent thrombosis status-post PTCA    Presenting Problem/History of Present Illness  ACS (acute coronary syndrome) [I24.9]  Chest pain [R07.9]        Hospital Course  Patient is a 47 y.o. female with history of coronary artery disease, hypertension, anxiety, obesity, tobacco abuse and polysubstance abuse who presents to [facility] ED as a transfer from [facility] with complaints of substernal chest pain. The patient had notable troponin peak of 92. She was taken for cardiac cath which revealed in-stent thrombosis of mid left circumflex. This was treated with PTCA and revealed EMMY-3 flow after. She tolerated the procedure well and no obvious complications were noted. Her echo showed LVEF 55%. Of note, the patent was noted with non-palpable DP/PT pulses of right the day of admission. Doppler ultimately revealed pulse. Arterial ultrasound showed stenosis of SFA of right. This was reviewed by vascular and a close outpatient follow-up was recommended. Also of note, the patient appeared hyperexcitable, anxious upon admission. Her drug screen was positive for amphetamines and THC. She only admits to marijuana use. She appears much calmer today. She continues with stability, denying any further chest pain, dyspnea, leg pain or similar. She tells me she is ready for discharge.    Procedures Performed  Procedure(s):  Left Heart Cath  Percutaneous Coronary Intervention  Percutaneous Mechanical Thrombectomy         Echo EF Estimated  Lab Results   Component Value Date    ECHOEFEST 55 12/18/2017       Condition on Discharge:  Stable, no acute distress    Physical Exam at Discharge  Const: aaox3, no acute distress  Heart: NRR, no m,g,r  Chest: Lungs CTAB  Abd: bsx4,nd,nt  Ext: no edema, pulses +2 L, doppler + left DP  Skin: no evidence of infection or hematoma    Vital Signs  Temp:  [97.4 °F (36.3 °C)-98.8 °F (37.1 °C)] 98.8 °F (37.1  °C)  Heart Rate:  [72-84] 79  Resp:  [18-20] 18  BP: ()/(47-94) 128/68    Discharge Disposition  Home or Self Care    Discharge Medications   Serene Cr   Home Medication Instructions FABRIZIO:[id number]    Printed on:12/19/17 4059   Medication Information                      albuterol (PROVENTIL HFA;VENTOLIN HFA) 108 (90 Base) MCG/ACT inhaler  Inhale 2 puffs Every 4 (Four) Hours As Needed for Wheezing.             amLODIPine (NORVASC) 2.5 MG tablet  Take 2.5 mg by mouth Every Night.             aspirin 81 MG chewable tablet  Chew 81 mg Every Night.             atorvastatin (LIPITOR) 80 MG tablet  Take 1 tablet by mouth Every Night.             bumetanide (BUMEX) 1 MG tablet  Take 1 mg by mouth Daily As Needed (swelling).             carvedilol (COREG) 3.125 MG tablet  Take 1 tablet by mouth Every 12 (Twelve) Hours.             cyclobenzaprine (FLEXERIL) 10 MG tablet  Take 10 mg by mouth 3 (Three) Times a Day As Needed for Muscle Spasms.             diclofenac (VOLTAREN) 75 MG EC tablet  Take 75 mg by mouth 2 (Two) Times a Day.             fluconazole (DIFLUCAN) 150 MG tablet  Take 150 mg by mouth Daily As Needed (yeast infection).             glycopyrrolate (ROBINUL) 1 MG tablet  Take 1 mg by mouth 4 (Four) Times a Day.             lisinopril (PRINIVIL,ZESTRIL) 20 MG tablet  Take 1 tablet by mouth Daily.             LORazepam (ATIVAN) 0.5 MG tablet  Take 0.5 mg by mouth 2 (Two) Times a Day As Needed for Anxiety.             nicotine (NICODERM CQ) 14 MG/24HR patch  Place 1 patch on the skin Daily.             omeprazole (priLOSEC) 20 MG capsule  Take 20 mg by mouth Daily.             potassium chloride (K-DUR,KLOR-CON) 20 MEQ CR tablet  Take 20 mEq by mouth Daily As Needed (ONLY when Bumex is taken.).             pregabalin (LYRICA) 150 MG capsule  Take 150 mg by mouth 3 (Three) Times a Day.             ticagrelor (BRILINTA) 90 MG tablet tablet  Take 1 tablet by mouth 2 (Two) Times a Day.                  Discharge Diet: as below    Activity at Discharge:  as below    Follow-up Appointments  Future Appointments  Date Time Provider Department Center   1/4/2018 10:00 AM Betito Mckeon, DO [facility] None   1/31/2018 9:15 AM Clinton Reddy MD [facility] None     as below    Test Results Pending at Discharge  None    Discharge Instructions  Activity: no driving x 2 days, no tub baths or submersion in water x 5 days, no heavy lifting > 5 lbs or repetitive bending at groin site x 1-2 weeks  Diet: Cardiac diet  Medications: Take all medications as prescribed. It is IMPERATIVE to take both aspirin and brilinta daily without any missed doses to prevent stent from clotting, with risks including heart attack, up to point of death. Please check on brilinta price (by calling insurance company) well before samples are complete; call if this is to expensive.   Follow-up: Follow-up with primary care provider in 1 week, Dr. Mckeon as scheduled, Dr. Reddy in 4 weeks.  Other: Check groin site for signs of infection, including drainage, redness, tenderness, fever; call if any occur. Please stop drug use and tobacco use.        Nona Fuentes PA-C  12/19/17  12:24 PM

## 2017-12-19 NOTE — PLAN OF CARE
Problem: Patient Care Overview (Adult)  Goal: Plan of Care Review  Outcome: Ongoing (interventions implemented as appropriate)    12/18/17 9539   Coping/Psychosocial Response Interventions   Plan Of Care Reviewed With patient   Patient Care Overview   Progress progress toward functional goals as expected   Outcome Evaluation   Outcome Summary/Follow up Plan no c/o of pain. left groin site c/d/i PPP. dr ivory consulted for right leg from previous procedure with noted narrowing       Goal: Adult Individualization and Mutuality  Outcome: Ongoing (interventions implemented as appropriate)  Goal: Discharge Needs Assessment  Outcome: Ongoing (interventions implemented as appropriate)    Problem: Acute Coronary Syndrome (ACS) (Adult)  Goal: Signs and Symptoms of Listed Potential Problems Will be Absent or Manageable (Acute Coronary Syndrome)  Outcome: Ongoing (interventions implemented as appropriate)    Problem: Fall Risk (Adult)  Goal: Absence of Falls  Outcome: Ongoing (interventions implemented as appropriate)    Problem: Pain, Chronic (Adult)  Goal: Acceptable Pain Control/Comfort Level  Outcome: Ongoing (interventions implemented as appropriate)    Problem: SUBSTANCE USE/ABUSE  Goal: By day 5 will complete medical detox and be discharged with an appropriate treatment plan in place.  Outcome: Ongoing (interventions implemented as appropriate)  Goal: By discharge, will develop insight into their chemical dependency and sustain motivation to continue in recovery.  Outcome: Ongoing (interventions implemented as appropriate)  Goal: By discharge, will have in place an ongoing treatment plan in collaboration with assigned CDP.  Outcome: Ongoing (interventions implemented as appropriate)

## 2017-12-20 NOTE — PAYOR COMM NOTE
"ADMIT INPT 12-17-17  DC HOME 12-19-17  UR PHONE    [phone]    Serene Ramos (47 y.o. Female)     Date of Birth Social Security Number Address Home Phone MRN    1970  [id number] [address]  [id number]    [place] Marital Status          None Single       Admission Date Admission Type Admitting Provider Attending Provider Department, Room/Bed    12/17/17 Emergency Clinton Reddy MD  [facility] 4B, 446/1    Discharge Date Discharge Disposition Discharge Destination        12/19/2017 Home or Self Care             Attending Provider: (none)    Allergies:  Ciprofloxacin, Hydrocodone, Metoprolol, Quinolones, Sulfa Antibiotics    Isolation:  None   Infection:  None   Code Status:  Prior    Ht:  165.1 cm (65\")   Wt:  140 kg (309 lb 8 oz)    Admission Cmt:  None   Principal Problem:  Chest pain [R07.9]                 Active Insurance as of 12/17/2017     Primary Coverage     Payor Plan Insurance Group Employer/Plan Group    [employer] [place] [employer] [place]ICA[place]      Payor Plan Address Payor Plan Phone Number Effective From Effective To    [address] [address] [phone] 11/30/2017     [place] [address]       Subscriber Name Subscriber Birth Date Member ID       SERENE RAMOS 1970 [id number]                 Emergency Contacts      (Rel.) Home Phone Work Phone Mobile Phone    Ayad Malin (Significant Other) -- -- [phone]               Physician Progress Notes (most recent note)      RAMONITA Estevez at 12/18/2017  2:30 PM  Version 1 of 1    Attestation signed by Clinton Reddy MD at 12/18/2017 11:57 PM        I have seen and evaluated this patient personally. I agree with the findings, assessment and plan as outlined by mid level provider. In addition, I have the following to add.    Face to face encounter [Hour of, ( time)] : 1720     LOS: 1 day   Patient Care Team:  Patrick Redd MD as PCP - General  John E Broadbent, MD as " Referring Physician (Cardiology)  Clinton Reddy MD as Cardiologist (Cardiology)    Chief Complaint: Principal Problem:    Chest pain  Active Problems:    Coronary artery disease    Status post insertion of drug eluting coronary artery stent    Mixed hyperlipidemia    Essential hypertension    TUSHAR (obstructive sleep apnea)    Class 3 obesity due to excess calories with serious comorbidity and body mass index (BMI) of 50.0 to 59.9 in adult    Tobacco abuse    ACS (acute coronary syndrome)    Coronary stent restenosis    Illicit drug use    Shortness of breath    Subjective  Feeling  better  No chest pain   No excessive shortness of breath  No new complaints    Interval History: Improved overall    Patient Complaints:   Chief Complaint   Patient presents with   • Chest Pain   • [facility] Transfer       Denies chest pain currently. Denies excessive shortness of breath. Denies abdominal pain, nausea vomiting or diarrhoea.    Telemetry: no malignant arrhythmia. No significant pauses.    Review of Systems   Constitutional: Negative for chills, fatigue and fever.   HENT: Negative.    Eyes: Negative.    Respiratory: Negative for cough,   No chest wall soreness,   Mild shortness of breath,   no wheezing, no stridor.    Cardiovascular: Negative for chest pain,   No palpitations   No significant  leg swelling.   Gastrointestinal: Negative for abdominal distention,  No abdominal pain, No blood in stool, constipation, diarrhea, nausea and vomiting.   Endocrine: Negative.    Genitourinary: Negative for difficulty urinating, dysuria, flank pain and hematuria.   Musculoskeletal: Negative.    Skin: Negative for rash and wound.   Allergic/Immunologic: Negative.    Neurological: Negative for dizziness, syncope, weakness, light-headedness and headaches.   Hematological: Does not bruise/bleed easily.   Psychiatric/Behavioral: Negative for agitation, behavioral problems, confusion, the patient cummings not appear to be nervous/anxious.        History:   Past Medical History:   Diagnosis Date   • Abnormal Pap smear of cervix    • Anxiety    • Arthritis    • Asthma    • CHF (congestive heart failure)    • Chlamydia    • COPD (chronic obstructive pulmonary disease)    • Epilepsy    • MRSA (methicillin resistant staph aureus) culture positive    • Panic disorder with agoraphobia    • Seasonal allergies    • Sleep apnea    • Stroke    • Wrist fracture      Past Surgical History:   Procedure Laterality Date   • CARDIAC CATHETERIZATION     • CARDIAC CATHETERIZATION Bilateral 12/8/2017    Procedure: Coronary angiography;  Surgeon: Clinton Reddy MD;  Location:  [facility];  Service:    • CARDIAC CATHETERIZATION Left 12/8/2017    Procedure: Stent NAYAN coronary;  Surgeon: Clinton Reddy MD;  Location:  [facility];  Service:    • CARDIAC CATHETERIZATION Right 12/8/2017    Procedure: Functional Flow [place];  Surgeon: Clinton Reddy MD;  Location:  [facility]ATH INV[facility];  Service:    • CARDIAC CATHETERIZATION N/A 12/17/2017    Procedure: Left Heart Cath;  Surgeon: Clinton Reddy MD;  Location:  [facility];  Service:    • CARDIAC CATHETERIZATION  12/17/2017    Procedure: Percutaneous Mechanical Thrombectomy;  Surgeon: Clinton Reddy MD;  Location:  [facility];  Service:    • CAROTID ENDARTERECTOMY     • NC RT/LT HEART CATHETERS N/A 12/17/2017    Procedure: Percutaneous Coronary Intervention;  Surgeon: Clinton Reddy MD;  Location:  [facility];  Service: Cardiology     Social History     Social History   • Marital status: Single     Spouse name: N/A   • Number of children: N/A   • Years of education: N/A     Occupational History   • Not on file.     Social History Main Topics   • Smoking status: Current Every Day Smoker     Packs/day: 1.00     Types: Cigarettes   • Smokeless tobacco: Not on file   • Alcohol use No   • Drug use: Not on file   • Sexual activity: Defer     Other  Topics Concern   • Not on file     Social History Narrative     History reviewed. No pertinent family history.    Labs:  WBC WBC   Date Value Ref Range Status   12/18/2017 16.30 (H) 4.80 - 10.80 10*3/mm3 Final   12/17/2017 20.43 (H) 4.80 - 10.80 10*3/mm3 Final      HGB Hemoglobin   Date Value Ref Range Status   12/18/2017 11.6 (L) 12.0 - 16.0 g/dL Final   12/17/2017 11.5 (L) 12.0 - 16.0 g/dL Final      HCT Hematocrit   Date Value Ref Range Status   12/18/2017 35.7 (L) 37.0 - 47.0 % Final   12/17/2017 35.5 (L) 37.0 - 47.0 % Final   12/17/2017 34.2 (L) 37.0 - 47.0 % Final      Platlets Platelets   Date Value Ref Range Status   12/18/2017 431 (H) 130 - 400 10*3/mm3 Final   12/17/2017 421 (H) 130 - 400 10*3/mm3 Final   12/17/2017 453 (H) 130 - 400 10*3/mm3 Final      MCV MCV   Date Value Ref Range Status   12/18/2017 86.7 82.0 - 98.0 fL Final   12/17/2017 86.8 82.0 - 98.0 fL Final        Results from last 7 days  Lab Units 12/18/17  0210   SODIUM mmol/L 139   POTASSIUM mmol/L 3.7   CHLORIDE mmol/L 105   CO2 mmol/L 27.0   BUN mg/dL 16   CREATININE mg/dL 0.92   CALCIUM mg/dL 8.4   GLUCOSE mg/dL 100     Lab Results   Component Value Date    TROPONINI 62.600 (C) 12/18/2017     PT/INR:    Protime   Date Value Ref Range Status   12/17/2017 14.0 11.9 - 14.6 Seconds Final   /  INR   Date Value Ref Range Status   12/17/2017 1.05 0.91 - 1.09 Final       Imaging Results (last 72 hours)     Procedure Component Value Units Date/Time    CT Angiogram Chest With Contrast [[id number]] Collected:  12/17/17 1315     Updated:  12/17/17 1322    Narrative:       CT ANGIOGRAM CHEST W CONTRAST- 12/17/2017 1:00 PM CST      HISTORY: Chest pain      COMPARISON: None.      DOSE LENGTH PRODUCT: 529 mGy cm. Automated exposure control was also  utilized to decrease patient radiation dose.     TECHNIQUE: Helical tomographic images of the chest were obtained after  the administration of intravenous contrast following angiogram protocol.  Additionally,  3D and multiplanar reformatted images were provided.        FINDINGS:    Pulmonary arteries: There is adequate enhancement of the pulmonary  arteries to evaluate for central and segmental pulmonary emboli. There  are no filling defects within the main, lobar, segmental or visualized  subsegmental pulmonary arteries. The pulmonary arteries are within  normal limits for size.      Aorta and great vessels: The aorta is well opacified and demonstrates no  dissection or aneurysm. The great vessels are normal in appearance.     Visualized neck base: The imaged portion of the base of the neck appears  unremarkable.      Lungs: The lungs are clear, except for a 7 mm partly solid nodule in the  LEFT upper lobe on axial image 28. The trachea and bronchial tree are  patent.      Heart: The heart is normal in size. There is no pericardial effusion.      Mediastinum and lymph nodes: No enlarged mediastinal, hilar, or axillary  lymph nodes are present.      Skeletal and soft tissues: The osseous structures of the thorax and  surrounding soft tissues demonstrate no acute process.     Upper abdomen: The imaged portion of the upper abdomen demonstrates no  acute process.        Impression:       1. No evidence of pulmonary embolus or other acute cardiopulmonary  process.  2. A few bronchial secretions are noted. There is mild peribronchial  thickening. Findings are likely due to bronchitis.  3. There is a partly solid nodule in the LEFT upper lobe that should be  followed per the Fleischner criteria.     Fleischner Society Recommendations for Management of PARTLY SOLID  Pulmonary nodules:     SOLITARY NODULES:  1. For partly solid nodules measuring less than 6 mm in diameter, no  follow-up is needed.  2. For partly solid nodules measuring greater than 6 mm, CT in 3 to 6  months to confirm persistence. If unchanged and a solid component is  below 6 mm, CT annually for 5 years. (Persistent, partly solid nodules  containing a solid  component greater than 6 mm are highly suspicious).        This report was finalized on 12/17/2017 13:19 by Dr. Robb Garcia MD.    US Arterial Doppler Lower Extremity Complete [[id number]] Updated:  12/18/17 1102    US Venous Doppler Lower Extremity Bilateral (duplex) [[id number]] Collected:  12/18/17 1822     Updated:  12/18/17 1826    Narrative:       History: Swelling       Impression:       Impression: There is no evidence of deep venous thrombosis or  superficial thrombophlebitis of right or left lower extremities.     Comments: Bilateral lower extremity venous duplex exam was performed  using color Doppler flow, Doppler waveform analysis, and grayscale  imaging, with and without compression. There is no evidence of deep  venous thrombosis in the common femoral, superficial femoral, popliteal,  peroneal, anterior tibial, and posterior tibial veins bilaterally. No  thrombus is identified in the saphenofemoral junctions and greater  saphenous veins bilaterally.         This report was finalized on 12/18/2017 18:23 by Dr. Betito Mckeon MD.          Objective     Allergies   Allergen Reactions   • Ciprofloxacin Nausea And Vomiting   • Hydrocodone Nausea Only   • Metoprolol Itching   • Quinolones Nausea And Vomiting   • Sulfa Antibiotics Nausea And Vomiting       Medication Review: Performed  Current Facility-Administered Medications   Medication Dose Route Frequency Provider Last Rate Last Dose   • albuterol (PROVENTIL) nebulizer solution 0.083% 2.5 mg/3mL  2.5 mg Nebulization Q4H PRN Clinton Reddy MD       • amLODIPine (NORVASC) tablet 2.5 mg  2.5 mg Oral Daily Nona Fuentes PA-C   2.5 mg at 12/18/17 0927   • aspirin chewable tablet 81 mg  81 mg Oral Daily Nona Fuentes PA-C   81 mg at 12/18/17 0927   • atorvastatin (LIPITOR) tablet 80 mg  80 mg Oral Nightly Nona Fuentes PA-C   80 mg at 12/18/17 2057   • bumetanide (BUMEX) tablet 1 mg  1 mg Oral Daily PRN Nona Fuentes PA-C       • carvedilol (COREG)  "tablet 3.125 mg  3.125 mg Oral Q12H Nona Fuentes PA-C   3.125 mg at 12/18/17 2057   • cyclobenzaprine (FLEXERIL) tablet 10 mg  10 mg Oral TID PRN Nona Fuentes PA-C       • enoxaparin (LOVENOX) syringe 40 mg  40 mg Subcutaneous Daily Clinton Reddy MD   40 mg at 12/18/17 0927   • glycopyrrolate (ROBINUL) tablet 1 mg  1 mg Oral 4x Daily Nona Fuentes PA-C   1 mg at 12/18/17 2057   • lisinopril (PRINIVIL,ZESTRIL) tablet 20 mg  20 mg Oral Daily Nona Fuentes PA-C   20 mg at 12/18/17 0927   • LORazepam (ATIVAN) tablet 0.5 mg  0.5 mg Oral BID PRN Clinton Reddy MD   0.5 mg at 12/17/17 1316   • nicotine (NICODERM CQ) 14 MG/24HR patch 1 patch  1 patch Transdermal Q24H Nona Fuentes PA-C   1 patch at 12/18/17 0929   • pantoprazole (PROTONIX) EC tablet 40 mg  40 mg Oral Q AM Nona Fuentes PA-C   40 mg at 12/18/17 0606   • potassium chloride (MICRO-K) CR capsule 20 mEq  20 mEq Oral Daily PRN Clinton Reddy MD       • pregabalin (LYRICA) capsule 150 mg  150 mg Oral TID Clinton Reddy MD   150 mg at 12/18/17 2057   • sodium chloride 0.9 % flush 1-10 mL  1-10 mL Intravenous PRN Nona Fuentes PA-C       • ticagrelor (BRILINTA) tablet 90 mg  90 mg Oral BID Clinton Reddy MD   90 mg at 12/18/17 1705       Vital Sign Min/Max for last 24 hours  Temp  Min: 97.3 °F (36.3 °C)  Max: 97.9 °F (36.6 °C)   BP  Min: 90/57  Max: 140/77   Pulse  Min: 64  Max: 87   Resp  Min: 20  Max: 20   SpO2  Min: 94 %  Max: 99 %   Flow (L/min)  Min: 2  Max: 2   Weight  Min: 140 kg (309 lb 8 oz)  Max: 140 kg (309 lb 8 oz)     Flowsheet Rows         First Filed Value    Admission Height  165.1 cm (65\") Documented at 12/17/2017 0944    Admission Weight  (!)  138 kg (305 lb) Documented at 12/17/2017 0944               Physical Exam:  Eyes: Pupils equal and reactive    Ears: Appear intact with no abnormalities noted  Nose: Nares normal, no drainage  Neck: supple, trachea midline, no carotid bruit and no JVD  Back: no kyphosis present,    Lungs: respirations " regular, respirations even and respirations unlabored  Heart: normal S1, S2,  2/6 pansystolic murmur in the left sternal border,  no rub and no click  Abdomen: soft  Skin: no bleeding, bruising or rash  Extremities no cyanosis     Results Review:   I reviewed the patient's new clinical results.  I reviewed the patient's new imaging results and agree with the interpretation.  I reviewed the patient's other test results and agree with the interpretation  I personally viewed and interpreted the patient's EKG/Telemetry data  Discussed with patient,      Assessment/Plan     Principal Problem:    Chest pain  Active Problems:    Coronary artery disease    Status post insertion of drug eluting coronary artery stent    Mixed hyperlipidemia    Essential hypertension    TUSHAR (obstructive sleep apnea)    Class 3 obesity due to excess calories with serious comorbidity and body mass index (BMI) of 50.0 to 59.9 in adult    Tobacco abuse    ACS (acute coronary syndrome)    Coronary stent restenosis    Illicit drug use      Plan    Continue same medications   Continue dual antiplatelet therapy  X 1 year,  Aspirin for life  There have been some probable medication compliance issues here. I have discussed with her the great importance of following the treatment plan exactly as directed in order to achieve a good medical outcome.  OK to 4b  Supportive care  Telemetry  Optimal medical therapy  Deep vein thrombosis prophylaxis/precautions  Appropriate diet, fluid, sodium, caffeine, stimulants intake   Compliance to diet and medications   Avoid NSAIDS    Clinton Reddy MD  12/18/17  11:55 PM                                      [facility] -  Progress Note     LOS: 1 day   Patient Care Team:  Patrick Redd MD as PCP - General  John E Broadbent, MD as Referring Physician (Cardiology)  Clinton Reddy MD as Cardiologist (Cardiology)    Chief Complaint: Chest pain    Subjective     Interval History: Heart cath yesterday  "revealed acute stent thrombosis of mid-left circumflex artery treated with serial percutaneous coronary angioplasty. Procedure was tolerated well without obvious complications. Troponin peaked at 92 and has trended down to 62.6. Pt is doing well today. Denies chest pain, shortness of breath, palpitations, dizziness, syncope, orthopnea, PND or swelling. 2D echo, bilateral lower extremity venous ultrasound and bilateral lower extremity arterial ultrasound results pending. Right DP and PT pulses present with doppler only.     Patient Complaints: None. \"Ready to go home.\"        Review of Systems:     Review of Systems   Constitutional: Negative for chills, fatigue and fever.   HENT: Negative.    Eyes: Negative.    Respiratory: Negative for cough, chest tightness, shortness of breath, wheezing and stridor.    Cardiovascular: Negative for chest pain, palpitations and leg swelling.   Gastrointestinal: Negative for abdominal distention, abdominal pain, blood in stool, constipation, diarrhea, nausea and vomiting.   Endocrine: Negative.    Genitourinary: Negative for difficulty urinating, dysuria, flank pain and hematuria.   Musculoskeletal: Negative.    Skin: Negative for rash and wound.   Allergic/Immunologic: Negative.    Neurological: Negative for dizziness, syncope, weakness, light-headedness and headaches.   Hematological: Does not bruise/bleed easily.   Psychiatric/Behavioral: Negative for agitation, behavioral problems, confusion, hallucinations, sleep disturbance and suicidal ideas. The patient is not nervous/anxious.      Objective     Vital Sign Min/Max for last 24 hours  Temp  Min: 97.3 °F (36.3 °C)  Max: 98.6 °F (37 °C)   BP  Min: 90/57  Max: 140/97   Pulse  Min: 64  Max: 85   Resp  Min: 14  Max: 20   SpO2  Min: 89 %  Max: 99 %   Flow (L/min)  Min: 2  Max: 3   Weight  Min: 132 kg (291 lb 1.6 oz)  Max: 132 kg (291 lb 1.6 oz)     Last Weight    12/17/17  1444   Weight: 132 kg (291 lb 1.6 oz) (bed zeroed before pt " got into bed)       Physical Exam:    Physical Exam   Constitutional: She is oriented to person, place, and time. Vital signs are normal. She appears well-developed and well-nourished. No distress.   HENT:   Head: Normocephalic and atraumatic.   Right Ear: External ear normal.   Left Ear: External ear normal.   Nose: Nose normal.   Eyes: Conjunctivae are normal. Pupils are equal, round, and reactive to light. Right eye exhibits no discharge. Left eye exhibits no discharge.   Neck: Normal range of motion. Neck supple. No JVD present. Carotid bruit is not present. No tracheal deviation present. No thyromegaly present.   Cardiovascular: Normal rate, regular rhythm, normal heart sounds and intact distal pulses.  PMI is not displaced.  Exam reveals no gallop and no friction rub.    No murmur heard.  Pulses:       Radial pulses are 2+ on the right side, and 2+ on the left side.        Dorsalis pedis pulses are 1+ on the right side, and 2+ on the left side.        Posterior tibial pulses are 1+ on the right side, and 2+ on the left side.   Pulmonary/Chest: Effort normal and breath sounds normal. No respiratory distress. She has no decreased breath sounds. She has no wheezes. She has no rhonchi. She has no rales. She exhibits no tenderness.   Abdominal: Soft. She exhibits no distension. There is no tenderness.   Musculoskeletal: Normal range of motion. She exhibits no edema, tenderness or deformity.   Neurological: She is alert and oriented to person, place, and time.   Skin: Skin is warm and dry. No rash noted. She is not diaphoretic. No erythema. No pallor.        Psychiatric: She has a normal mood and affect. Her behavior is normal. Judgment and thought content normal.   Vitals reviewed.    Results Review:   Lab Results (last 72 hours)     Procedure Component Value Units Date/Time    D-dimer, Quantitative [[id number]]  (Abnormal) Collected:  12/17/17 0955    Specimen:  Blood Updated:  12/17/17 1022     D-Dimer,  Quantitative 0.67 (H) mg/L (FEU)     Narrative:       Reference Range is 0-0.50 mg/L FEU. However, results <0.50 mg/L FEU tends to rule out DVT or PE. Results >0.50 mg/L FEU are not useful in predicting absence or presence of DVT or PE.    Troponin [[id number]]  (Abnormal) Collected:  12/17/17 0955    Specimen:  Blood Updated:  12/17/17 1114     Troponin I 1.220 (C) ng/mL     Hemoglobin A1c [[id number]] Collected:  12/17/17 1511    Specimen:  Blood Updated:  12/17/17 1545     Hemoglobin A1C 6.4 %     Narrative:       Less than 6.0           Non-Diabetic Range  6.0-7.0                 ADA Therapeutic Target  Greater than 7.0        Action Suggested    Lipid Panel [[id number]]  (Abnormal) Collected:  12/17/17 0955    Specimen:  Blood Updated:  12/17/17 1602     Total Cholesterol 221 (H) mg/dL      Triglycerides 265 (H) mg/dL      HDL Cholesterol 42 (L) mg/dL      LDL Cholesterol  146 (H) mg/dL      LDL/HDL Ratio 3.00    Troponin [[id number]]  (Abnormal) Collected:  12/17/17 1747    Specimen:  Blood Updated:  12/17/17 1826     Troponin I 26.200 (C) ng/mL     Urine Drug Screen - Urine, Clean Catch [[id number]]  (Abnormal) Collected:  12/17/17 1821    Specimen:  Urine from Urine, Clean Catch Updated:  12/17/17 1915     Amphetamine Screen, Urine Positive (A)     Barbiturates Screen, Urine Negative     Benzodiazepine Screen, Urine Negative     Cocaine Screen, Urine Negative     Methadone Screen, Urine Negative     Opiate Screen Positive (A)     Phencyclidine (PCP), Urine Negative     THC, Screen, Urine Positive (A)    Narrative:       Negative Thresholds For Drugs Screened in Urine:    Amphetamines          500 ng/ml  Barbiturates          200 ng/ml  Benzodiazepines       200 ng/ml  Cocaine               150 ng/ml  Methadone             150 ng/ml  Opiates               300 ng/ml  Phencyclidine         25 ng/ml  THC                      50 ng/ml    The normal value for all drugs tested is negative. This report includes final  unconfirmed screening results.  A positive result by this assay can be, at your request, sent to the Reference Lab for confirmation by gas chromatography. Unconfirmed results must not be used for non-medical purposes, such as employment or legal testing. Clinical consideration should be applied to any drug of abuse test result, particularly when unconfirmed results are used.    P2Y12 Platelet Inhibition [[id number]]  (Abnormal) Collected:  12/17/17 2127    Specimen:  Blood Updated:  12/17/17 2144     Hematocrit 34.2 (L) %      Platelets 453 (H) 10*3/mm3      P2Y12 Reactivity Unit 271 PRU     Narrative:       PRU reference range 194-418 is for patients not receiving P2Y12 drug. Post Drug results: Lower PRU levels are associated with expected antiplatelet effect. Values may be below the stated reference range above. The post-drug PRU values reported are .        CBC & Differential [[id number]] Collected:  12/17/17 2248    Specimen:  Blood Updated:  12/17/17 2307    Narrative:       The following orders were created for panel order CBC & Differential.  Procedure                               Abnormality         Status                     ---------                               -----------         ------                     CBC Auto Differential[[id number]]        Abnormal            Final result                 Please view results for these tests on the individual orders.    CBC Auto Differential [[id number]]  (Abnormal) Collected:  12/17/17 2248    Specimen:  Blood Updated:  12/17/17 2307     WBC 20.43 (H) 10*3/mm3      RBC 4.09 (L) 10*6/mm3      Hemoglobin 11.5 (L) g/dL      Hematocrit 35.5 (L) %      MCV 86.8 fL      MCH 28.1 pg      MCHC 32.4 (L) g/dL      RDW 13.6 %      RDW-SD 43.4 fl      MPV 9.4 fL      Platelets 421 (H) 10*3/mm3      Neutrophil % 61.9 %      Lymphocyte % 26.9 %      Monocyte % 9.3 %      Eosinophil % 1.1 %      Basophil % 0.4 %      Immature Grans % 0.4 %      Neutrophils, Absolute 12.67 (H)  10*3/mm3      Lymphocytes, Absolute 5.49 (H) 10*3/mm3      Monocytes, Absolute 1.89 (H) 10*3/mm3      Eosinophils, Absolute 0.22 10*3/mm3      Basophils, Absolute 0.08 10*3/mm3      Immature Grans, Absolute 0.08 (H) 10*3/mm3      nRBC 0.0 /100 WBC     aPTT [[id number]]  (Abnormal) Collected:  12/17/17 2248    Specimen:  Blood Updated:  12/17/17 2315     PTT 45.4 (H) seconds     Protime-INR [[id number]]  (Normal) Collected:  12/17/17 2248    Specimen:  Blood Updated:  12/17/17 2315     Protime 14.0 Seconds      INR 1.05    CBC (No Diff) [[id number]]  (Abnormal) Collected:  12/18/17 0210    Specimen:  Blood Updated:  12/18/17 0231     WBC 16.30 (H) 10*3/mm3      RBC 4.12 (L) 10*6/mm3      Hemoglobin 11.6 (L) g/dL      Hematocrit 35.7 (L) %      MCV 86.7 fL      MCH 28.2 pg      MCHC 32.5 (L) g/dL      RDW 13.7 %      RDW-SD 43.3 fl      MPV 9.1 fL      Platelets 431 (H) 10*3/mm3     Basic Metabolic Panel [[id number]]  (Normal) Collected:  12/18/17 0210    Specimen:  Blood Updated:  12/18/17 0247     Glucose 100 mg/dL      BUN 16 mg/dL      Creatinine 0.92 mg/dL      Sodium 139 mmol/L      Potassium 3.7 mmol/L      Chloride 105 mmol/L      CO2 27.0 mmol/L      Calcium 8.4 mg/dL      eGFR Non African Amer 66 mL/min/1.73      BUN/Creatinine Ratio 17.4     Anion Gap 7.0 mmol/L     Narrative:       GFR Normal >60  Chronic Kidney Disease <60  Kidney Failure <15    Lipid Panel [[id number]]  (Abnormal) Collected:  12/18/17 0210    Specimen:  Blood Updated:  12/18/17 0255     Total Cholesterol 160 mg/dL      Triglycerides 358 (H) mg/dL      HDL Cholesterol 28 (L) mg/dL      LDL Cholesterol  103 (H) mg/dL      LDL/HDL Ratio 2.16    Troponin [[id number]]  (Abnormal) Collected:  12/18/17 0210    Specimen:  Blood Updated:  12/18/17 0337     Troponin I 92.000 (C) ng/mL     Troponin [[id number]]  (Abnormal) Collected:  12/18/17 0626    Specimen:  Blood Updated:  12/18/17 0703     Troponin I 62.600 (C) ng/mL       Specimen hemolyzed.   Results may be affected.             Medication Review: yes  Current Facility-Administered Medications   Medication Dose Route Frequency Provider Last Rate Last Dose   • albuterol (PROVENTIL) nebulizer solution 0.083% 2.5 mg/3mL  2.5 mg Nebulization Q4H PRN Clinton Reddy MD       • amLODIPine (NORVASC) tablet 2.5 mg  2.5 mg Oral Daily Nona Fuentes PA-C   2.5 mg at 12/18/17 0927   • aspirin chewable tablet 81 mg  81 mg Oral Daily Nona Fuentes PA-C   81 mg at 12/18/17 0927   • atorvastatin (LIPITOR) tablet 80 mg  80 mg Oral Nightly Nona Fuentes PA-C   80 mg at 12/17/17 2224   • bumetanide (BUMEX) tablet 1 mg  1 mg Oral Daily PRN Nona Fuentes PA-C       • carvedilol (COREG) tablet 3.125 mg  3.125 mg Oral Q12H Nona Fuentes PA-C   3.125 mg at 12/18/17 0927   • cyclobenzaprine (FLEXERIL) tablet 10 mg  10 mg Oral TID PRN Nona Fuentes PA-C       • enoxaparin (LOVENOX) syringe 40 mg  40 mg Subcutaneous Daily Clinton Reddy MD   40 mg at 12/18/17 0927   • glycopyrrolate (ROBINUL) tablet 1 mg  1 mg Oral 4x Daily Nona Fuentes PA-C   1 mg at 12/18/17 1332   • lisinopril (PRINIVIL,ZESTRIL) tablet 20 mg  20 mg Oral Daily Nona Fuentes PA-C   20 mg at 12/18/17 0927   • LORazepam (ATIVAN) tablet 0.5 mg  0.5 mg Oral BID PRN Clinton Reddy MD   0.5 mg at 12/17/17 1316   • nicotine (NICODERM CQ) 14 MG/24HR patch 1 patch  1 patch Transdermal Q24H Nona Fuentes PA-C   1 patch at 12/18/17 0929   • pantoprazole (PROTONIX) EC tablet 40 mg  40 mg Oral Q AM Nona Fuentes PA-C   40 mg at 12/18/17 0606   • potassium chloride (MICRO-K) CR capsule 20 mEq  20 mEq Oral Daily PRN Clinton Reddy MD       • pregabalin (LYRICA) capsule 150 mg  150 mg Oral TID Clinton Reddy MD   150 mg at 12/18/17 0958   • sodium chloride 0.9 % flush 1-10 mL  1-10 mL Intravenous PRN Nona Fuentes PA-C       • sodium chloride 0.9 % infusion  100 mL/hr Intravenous Continuous Clinton Reddy MD   Stopped at 12/17/17 6377   • sodium chloride 0.9 % infusion  100 mL/hr  Intravenous Continuous Clinton Reddy  mL/hr at 12/18/17 0934 100 mL/hr at 12/18/17 0934   • ticagrelor (BRILINTA) tablet 90 mg  90 mg Oral BID Clinton Reddy MD   90 mg at 12/18/17 0929         Assessment/Plan       Principal Problem:    Chest pain  Active Problems:    ACS (acute coronary syndrome)    Coronary stent restenosis    Status post insertion of drug eluting coronary artery stent    Coronary artery disease    Mixed hyperlipidemia    Essential hypertension    TUSHAR (obstructive sleep apnea)    Class 3 obesity due to excess calories with serious comorbidity and body mass index (BMI) of 50.0 to 59.9 in adult    Tobacco abuse    Illicit drug use      Plan    1. Transfer to  telemetry floor.  2. Continue aspirin, brilinta, amlodipine, atorvastatin, carvedilol and lisinopril.  3. Continue cardiac diet.  4. CBC, BMP in am.   5. 2D echo results pending.  6. Bilateral lower extremity venous and arterial ultrasound results pending.     RAMONITA Estevez  12/18/17  2:33 PM                 Electronically signed by Clinton Reddy MD at 12/18/2017 11:57 PM        Consult Notes (all)     No notes of this type exist for this encounter.           Discharge Summary      Nona Fuentes PA-C at 12/19/2017 12:09 PM     Attestation signed by Clinton Reddy MD at 12/19/2017 11:05 PM        I have seen and evaluated this patient personally. I agree with the findings, assessment and plan as outlined by mid level provider. In addition, I have the following to add.    Face to face encounter [Hour of, ( time)] : 1255     LOS: 2 days   Patient Care Team:  Patrick Redd MD as PCP - General  John E Broadbent, MD as Referring Physician (Cardiology)  Clinton Reddy MD as Cardiologist (Cardiology)    Chief Complaint: Principal Problem:    Chest pain  Acute coronary syndrome   Percutaneous transluminal coronary angioplasty of Left circumflex artery   Shortness of breath    Subjective  Feeling  better  No chest pain   No excessive  shortness of breath  No new complaints    Interval History: Improved overall    Patient Complaints:   Chief Complaint   Patient presents with   • Chest Pain   • [facility] Transfer       Denies chest pain currently. Denies excessive shortness of breath. Denies abdominal pain, nausea vomiting or diarrhoea.    Telemetry: no malignant arrhythmia. No significant pauses.    Review of Systems   Constitutional: Negative for chills, fatigue and fever.   HENT: Negative.    Eyes: Negative.    Respiratory: Negative for cough,   No chest wall soreness,   Mild shortness of breath,   no wheezing, no stridor.    Cardiovascular: Negative for chest pain,   No palpitations   No significant  leg swelling.   Gastrointestinal: Negative for abdominal distention,  No abdominal pain, No blood in stool, constipation, diarrhea, nausea and vomiting.   Endocrine: Negative.    Genitourinary: Negative for difficulty urinating, dysuria, flank pain and hematuria.   Musculoskeletal: Negative.    Skin: Negative for rash and wound.   Allergic/Immunologic: Negative.    Neurological: Negative for dizziness, syncope, weakness, light-headedness and headaches.   Hematological: Does not bruise/bleed easily.   Psychiatric/Behavioral: Negative for agitation, behavioral problems, confusion, the patient cummings not appear to be nervous/anxious.       History:   Past Medical History:   Diagnosis Date   • Abnormal Pap smear of cervix    • Anxiety    • Arthritis    • Asthma    • CHF (congestive heart failure)    • Chlamydia    • COPD (chronic obstructive pulmonary disease)    • Epilepsy    • MRSA (methicillin resistant staph aureus) culture positive    • Panic disorder with agoraphobia    • Seasonal allergies    • Sleep apnea    • Stroke    • Wrist fracture      Past Surgical History:   Procedure Laterality Date   • CARDIAC CATHETERIZATION     • CARDIAC CATHETERIZATION Bilateral 12/8/2017    Procedure: Coronary angiography;  Surgeon: Clinton Reddy MD;  Location: [facility]  CATH INVASIVE LOCATION;  Service:    • CARDIAC CATHETERIZATION Left 12/8/2017    Procedure: Stent NAYAN coronary;  Surgeon: Clinton Reddy MD;  Location:  [facility] CATH INVASIVE LOCATION;  Service:    • CARDIAC CATHETERIZATION Right 12/8/2017    Procedure: Functional Flow [place];  Surgeon: Clinton Reddy MD;  Location:  [facility] CATH INVASIVE LOCATION;  Service:    • CARDIAC CATHETERIZATION N/A 12/17/2017    Procedure: Left Heart Cath;  Surgeon: Clinton Reddy MD;  Location:  [facility] CATH INVASIVE LOCATION;  Service:    • CARDIAC CATHETERIZATION  12/17/2017    Procedure: Percutaneous Mechanical Thrombectomy;  Surgeon: Clinton Reddy MD;  Location:  [facility] CATH INVASIVE LOCATION;  Service:    • CAROTID ENDARTERECTOMY     • AK RT/LT HEART CATHETERS N/A 12/17/2017    Procedure: Percutaneous Coronary Intervention;  Surgeon: Clinton Reddy MD;  Location:  [facility] CATH INVASIVE LOCATION;  Service: Cardiology     Social History     Social History   • Marital status: Single     Spouse name: N/A   • Number of children: N/A   • Years of education: N/A     Occupational History   • Not on file.     Social History Main Topics   • Smoking status: Current Every Day Smoker     Packs/day: 1.00     Types: Cigarettes   • Smokeless tobacco: Not on file   • Alcohol use No   • Drug use: Not on file   • Sexual activity: Defer     Other Topics Concern   • Not on file     Social History Narrative     History reviewed. No pertinent family history.    Labs:  WBC WBC   Date Value Ref Range Status   12/18/2017 16.30 (H) 4.80 - 10.80 10*3/mm3 Final   12/17/2017 20.43 (H) 4.80 - 10.80 10*3/mm3 Final      HGB Hemoglobin   Date Value Ref Range Status   12/18/2017 11.6 (L) 12.0 - 16.0 g/dL Final   12/17/2017 11.5 (L) 12.0 - 16.0 g/dL Final      HCT Hematocrit   Date Value Ref Range Status   12/18/2017 35.7 (L) 37.0 - 47.0 % Final   12/17/2017 35.5 (L) 37.0 - 47.0 % Final   12/17/2017 34.2 (L) 37.0 - 47.0 % Final      Platlets Platelets   Date Value Ref Range Status    12/18/2017 431 (H) 130 - 400 10*3/mm3 Final   12/17/2017 421 (H) 130 - 400 10*3/mm3 Final   12/17/2017 453 (H) 130 - 400 10*3/mm3 Final      MCV MCV   Date Value Ref Range Status   12/18/2017 86.7 82.0 - 98.0 fL Final   12/17/2017 86.8 82.0 - 98.0 fL Final        Results from last 7 days  Lab Units 12/18/17  0210   SODIUM mmol/L 139   POTASSIUM mmol/L 3.7   CHLORIDE mmol/L 105   CO2 mmol/L 27.0   BUN mg/dL 16   CREATININE mg/dL 0.92   CALCIUM mg/dL 8.4   GLUCOSE mg/dL 100     Lab Results   Component Value Date    TROPONINI 62.600 (C) 12/18/2017     PT/INR:    Protime   Date Value Ref Range Status   12/17/2017 14.0 11.9 - 14.6 Seconds Final   /  INR   Date Value Ref Range Status   12/17/2017 1.05 0.91 - 1.09 Final       Imaging Results (last 72 hours)     Procedure Component Value Units Date/Time    CT Angiogram Chest With Contrast [[id number]] Collected:  12/17/17 1315     Updated:  12/17/17 1322    Narrative:       CT ANGIOGRAM CHEST W CONTRAST- 12/17/2017 1:00 PM CST      HISTORY: Chest pain      COMPARISON: None.      DOSE LENGTH PRODUCT: 529 mGy cm. Automated exposure control was also  utilized to decrease patient radiation dose.     TECHNIQUE: Helical tomographic images of the chest were obtained after  the administration of intravenous contrast following angiogram protocol.  Additionally, 3D and multiplanar reformatted images were provided.        FINDINGS:    Pulmonary arteries: There is adequate enhancement of the pulmonary  arteries to evaluate for central and segmental pulmonary emboli. There  are no filling defects within the main, lobar, segmental or visualized  subsegmental pulmonary arteries. The pulmonary arteries are within  normal limits for size.      Aorta and great vessels: The aorta is well opacified and demonstrates no  dissection or aneurysm. The great vessels are normal in appearance.     Visualized neck base: The imaged portion of the base of the neck appears  unremarkable.      Lungs: The  lungs are clear, except for a 7 mm partly solid nodule in the  LEFT upper lobe on axial image 28. The trachea and bronchial tree are  patent.      Heart: The heart is normal in size. There is no pericardial effusion.      Mediastinum and lymph nodes: No enlarged mediastinal, hilar, or axillary  lymph nodes are present.      Skeletal and soft tissues: The osseous structures of the thorax and  surrounding soft tissues demonstrate no acute process.     Upper abdomen: The imaged portion of the upper abdomen demonstrates no  acute process.        Impression:       1. No evidence of pulmonary embolus or other acute cardiopulmonary  process.  2. A few bronchial secretions are noted. There is mild peribronchial  thickening. Findings are likely due to bronchitis.  3. There is a partly solid nodule in the LEFT upper lobe that should be  followed per the Fleischner criteria.     Fleischner Society Recommendations for Management of PARTLY SOLID  Pulmonary nodules:     SOLITARY NODULES:  1. For partly solid nodules measuring less than 6 mm in diameter, no  follow-up is needed.  2. For partly solid nodules measuring greater than 6 mm, CT in 3 to 6  months to confirm persistence. If unchanged and a solid component is  below 6 mm, CT annually for 5 years. (Persistent, partly solid nodules  containing a solid component greater than 6 mm are highly suspicious).        This report was finalized on 12/17/2017 13:19 by Dr. Robb Garcia MD.    US Arterial Doppler Lower Extremity Complete [[id number]] Updated:  12/18/17 1102    US Venous Doppler Lower Extremity Bilateral (duplex) [[id number]] Collected:  12/18/17 1822     Updated:  12/18/17 1826    Narrative:       History: Swelling       Impression:       Impression: There is no evidence of deep venous thrombosis or  superficial thrombophlebitis of right or left lower extremities.     Comments: Bilateral lower extremity venous duplex exam was performed  using color Doppler flow, Doppler  waveform analysis, and grayscale  imaging, with and without compression. There is no evidence of deep  venous thrombosis in the common femoral, superficial femoral, popliteal,  peroneal, anterior tibial, and posterior tibial veins bilaterally. No  thrombus is identified in the saphenofemoral junctions and greater  saphenous veins bilaterally.         This report was finalized on 12/18/2017 18:23 by Dr. Betito Mckeon MD.          Objective     Allergies   Allergen Reactions   • Ciprofloxacin Nausea And Vomiting   • Hydrocodone Nausea Only   • Metoprolol Itching   • Quinolones Nausea And Vomiting   • Sulfa Antibiotics Nausea And Vomiting       Medication Review: Performed  No current facility-administered medications for this encounter.      Current Outpatient Prescriptions   Medication Sig Dispense Refill   • albuterol (PROVENTIL HFA;VENTOLIN HFA) 108 (90 Base) MCG/ACT inhaler Inhale 2 puffs Every 4 (Four) Hours As Needed for Wheezing.     • amLODIPine (NORVASC) 2.5 MG tablet Take 2.5 mg by mouth Every Night.     • aspirin 81 MG chewable tablet Chew 81 mg Every Night.     • atorvastatin (LIPITOR) 80 MG tablet Take 1 tablet by mouth Every Night. 90 tablet 3   • bumetanide (BUMEX) 1 MG tablet Take 1 mg by mouth Daily As Needed (swelling).     • carvedilol (COREG) 3.125 MG tablet Take 1 tablet by mouth Every 12 (Twelve) Hours. 180 tablet 3   • cyclobenzaprine (FLEXERIL) 10 MG tablet Take 10 mg by mouth 3 (Three) Times a Day As Needed for Muscle Spasms.     • diclofenac (VOLTAREN) 75 MG EC tablet Take 75 mg by mouth 2 (Two) Times a Day.     • fluconazole (DIFLUCAN) 150 MG tablet Take 150 mg by mouth Daily As Needed (yeast infection).     • glycopyrrolate (ROBINUL) 1 MG tablet Take 1 mg by mouth 4 (Four) Times a Day.     • lisinopril (PRINIVIL,ZESTRIL) 20 MG tablet Take 1 tablet by mouth Daily. 90 tablet 3   • LORazepam (ATIVAN) 0.5 MG tablet Take 0.5 mg by mouth 2 (Two) Times a Day As Needed for Anxiety.     •  "omeprazole (priLOSEC) 20 MG capsule Take 20 mg by mouth Daily.     • potassium chloride (K-DUR,KLOR-CON) 20 MEQ CR tablet Take 20 mEq by mouth Daily As Needed (ONLY when Bumex is taken.).     • pregabalin (LYRICA) 150 MG capsule Take 150 mg by mouth 3 (Three) Times a Day.     • [START ON 12/20/2017] nicotine (NICODERM CQ) 14 MG/24HR patch Place 1 patch on the skin Daily. 21 patch 1   • ticagrelor (BRILINTA) 90 MG tablet tablet Take 1 tablet by mouth 2 (Two) Times a Day. 180 tablet 3       Vital Sign Min/Max for last 24 hours  Temp  Min: 97.7 °F (36.5 °C)  Max: 98.8 °F (37.1 °C)   BP  Min: 98/54  Max: 128/68   Pulse  Min: 76  Max: 83   Resp  Min: 18  Max: 18   SpO2  Min: 95 %  Max: 99 %   No Data Recorded   No Data Recorded     Flowsheet Rows         First Filed Value    Admission Height  165.1 cm (65\") Documented at 12/17/2017 0944    Admission Weight  (!)  138 kg (305 lb) Documented at 12/17/2017 0944          Results for orders placed during the hospital encounter of 12/17/17   Adult Transthoracic Echo Complete W/ Cont if Necessary Per Protocol    Narrative · Left ventricular systolic function is normal. Estimated EF = 55%.  · Left ventricular diastolic dysfunction (grade I a) consistent with   impaired relaxation.      Hospital course    Underwent cardiac cath with Percutaneous transluminal coronary angioplasty of Left circumflex artery for acute stent thrombosis likley to dual antiplatelet   Uneventful recovery  Hemodynamically and electrically stable with no malignant tachy or janet cardiac arrhythmia  stable overall. Oral intake is satisfactory.  Ambulating       Physical Exam:  Eyes: Pupils equal and reactive    Ears: Appear intact with no abnormalities noted  Nose: Nares normal, no drainage  Neck: supple, trachea midline, no carotid bruit and no JVD  Back: no kyphosis present,    Lungs: respirations regular, respirations even and respirations unlabored  Heart: normal S1, S2,  2/6 pansystolic murmur in the " left sternal border,  no rub and no click  Abdomen: soft  Skin: no bleeding, bruising or rash  Extremities no cyanosis     Results Review:   I reviewed the patient's new clinical results.  I reviewed the patient's new imaging results and agree with the interpretation.  I reviewed the patient's other test results and agree with the interpretation  I personally viewed and interpreted the patient's EKG/Telemetry data  Discussed with patient, and present family member(s)       Agree with assessment and plan of mid level provider as below with any changes if made as noted by Nona Fuentes PAC    Assessment/Plan     Principal Problem:    Chest pain  Active Problems:    Coronary artery disease    Status post insertion of drug eluting coronary artery stent    Mixed hyperlipidemia    Essential hypertension    TUSHAR (obstructive sleep apnea)    Class 3 obesity due to excess calories with serious comorbidity and body mass index (BMI) of 50.0 to 59.9 in adult    Tobacco abuse    ACS (acute coronary syndrome)    Coronary stent restenosis    Illicit drug use      Plan      Continue dual antiplatelet therapy  X 1 year,  Aspirin for life   OK to discharge  Low salt cardiac diet.   Discharge to home and or self care with improvement or resolution of presenting symptoms or complaints  Ambulating  Optimal medical therapy  Deep vein thrombosis precautions with hydration and ambulation  Counseled regarding disease appropriate diet, fluid, sodium, caffeine, stimulants intake   Stressed compliance to diet and medications   Avoid NSAIDS  Follow up with primary provider and primary cardiologist as scheduled   Overall improved and deemed fit for discharge  Will be provided with written discharge instructions including diet and medications including prescriptions as required and labs as applicable  Go to nearest emergency room if recurrence of primary complaints or any suspected disabling or life threatening symptoms or complaints such as chest  pain, increasing shortness of breath, profound dizziness, weakness, significant palpitations, passing out episodes, cold sweats, excessive nausea etc  More than 30 minutes spent in the discharge process.     Clinton Reddy MD  12/19/17  11:03 PM                                    [facility] DISCHARGE    Date of Discharge:  12/19/2017    Discharge Diagnosis: acute stent thrombosis status-post PTCA    Presenting Problem/History of Present Illness  ACS (acute coronary syndrome) [I24.9]  Chest pain [R07.9]        Hospital Course  Patient is a 47 y.o. female with history of coronary artery disease, hypertension, anxiety, obesity, tobacco abuse and polysubstance abuse who presents to [facility] ED as a transfer from [facility] with complaints of substernal chest pain. The patient had notable troponin peak of 92. She was taken for cardiac cath which revealed in-stent thrombosis of mid left circumflex. This was treated with PTCA and revealed EMMY-3 flow after. She tolerated the procedure well and no obvious complications were noted. Her echo showed LVEF 55%. Of note, the patent was noted with non-palpable DP/PT pulses of right the day of admission. Doppler ultimately revealed pulse. Arterial ultrasound showed stenosis of SFA of right. This was reviewed by vascular and a close outpatient follow-up was recommended. Also of note, the patient appeared hyperexcitable, anxious upon admission. Her drug screen was positive for amphetamines and THC. She only admits to marijuana use. She appears much calmer today. She continues with stability, denying any further chest pain, dyspnea, leg pain or similar. She tells me she is ready for discharge.    Procedures Performed  Procedure(s):  Left Heart Cath  Percutaneous Coronary Intervention  Percutaneous Mechanical Thrombectomy         Echo EF Estimated  Lab Results   Component Value Date    ECHOEFEST 55 12/18/2017       Condition on Discharge:  Stable, no acute  distress    Physical Exam at Discharge  Const: aaox3, no acute distress  Heart: NRR, no m,g,r  Chest: Lungs CTAB  Abd: bsx4,nd,nt  Ext: no edema, pulses +2 L, doppler + left DP  Skin: no evidence of infection or hematoma    Vital Signs  Temp:  [97.4 °F (36.3 °C)-98.8 °F (37.1 °C)] 98.8 °F (37.1 °C)  Heart Rate:  [72-84] 79  Resp:  [18-20] 18  BP: ()/(47-94) 128/68    Discharge Disposition  Home or Self Care    Discharge Medications   Serene Cr   Home Medication Instructions FABRIZIO:[id number]    Printed on:12/19/17 1224   Medication Information                      albuterol (PROVENTIL HFA;VENTOLIN HFA) 108 (90 Base) MCG/ACT inhaler  Inhale 2 puffs Every 4 (Four) Hours As Needed for Wheezing.             amLODIPine (NORVASC) 2.5 MG tablet  Take 2.5 mg by mouth Every Night.             aspirin 81 MG chewable tablet  Chew 81 mg Every Night.             atorvastatin (LIPITOR) 80 MG tablet  Take 1 tablet by mouth Every Night.             bumetanide (BUMEX) 1 MG tablet  Take 1 mg by mouth Daily As Needed (swelling).             carvedilol (COREG) 3.125 MG tablet  Take 1 tablet by mouth Every 12 (Twelve) Hours.             cyclobenzaprine (FLEXERIL) 10 MG tablet  Take 10 mg by mouth 3 (Three) Times a Day As Needed for Muscle Spasms.             diclofenac (VOLTAREN) 75 MG EC tablet  Take 75 mg by mouth 2 (Two) Times a Day.             fluconazole (DIFLUCAN) 150 MG tablet  Take 150 mg by mouth Daily As Needed (yeast infection).             glycopyrrolate (ROBINUL) 1 MG tablet  Take 1 mg by mouth 4 (Four) Times a Day.             lisinopril (PRINIVIL,ZESTRIL) 20 MG tablet  Take 1 tablet by mouth Daily.             LORazepam (ATIVAN) 0.5 MG tablet  Take 0.5 mg by mouth 2 (Two) Times a Day As Needed for Anxiety.             nicotine (NICODERM CQ) 14 MG/24HR patch  Place 1 patch on the skin Daily.             omeprazole (priLOSEC) 20 MG capsule  Take 20 mg by mouth Daily.             potassium chloride (K-DUR,KLOR-CON)  20 MEQ CR tablet  Take 20 mEq by mouth Daily As Needed (ONLY when Bumex is taken.).             pregabalin (LYRICA) 150 MG capsule  Take 150 mg by mouth 3 (Three) Times a Day.             ticagrelor (BRILINTA) 90 MG tablet tablet  Take 1 tablet by mouth 2 (Two) Times a Day.                 Discharge Diet: as below    Activity at Discharge:  as below    Follow-up Appointments  Future Appointments  Date Time Provider Department Center   2018 10:00 AM Betito Mckeon DO [facility] None   2018 9:15 AM Clinton Reddy MD [facility] None     as below    Test Results Pending at Discharge  None    Discharge Instructions  Activity: no driving x 2 days, no tub baths or submersion in water x 5 days, no heavy lifting > 5 lbs or repetitive bending at groin site x 1-2 weeks  Diet: Cardiac diet  Medications: Take all medications as prescribed. It is IMPERATIVE to take both aspirin and brilinta daily without any missed doses to prevent stent from clotting, with risks including heart attack, up to point of death. Please check on brilinta price (by calling insurance company) well before samples are complete; call if this is to expensive.   Follow-up: Follow-up with primary care provider in 1 week, Dr. Mckeon as scheduled, Dr. Reddy in 4 weeks.  Other: Check groin site for signs of infection, including drainage, redness, tenderness, fever; call if any occur. Please stop drug use and tobacco use.        Nona Fuentes PA-C  17  12:24 PM                     Electronically signed by Clinton Reddy MD at 2017 11:05 PM        [facility]  [address] [place] [id number] [phone]             Patient Information   Patient Name MRN Sex  (Age)   Serene Cr [id number] Female 1970 (47 y.o.)   Race Ethnicity Encounter Category   White or  Not  or  Emergency   Procedures   Left Heart Cath   Percutaneous Coronary Intervention   Percutaneous Mechanical  Thrombectomy    Performed Date   Dec 17, 2017      Physicians   Panel Physicians Referring Physician Case Authorizing Physician   Clinton Reddy MD (Primary)         Conclusion   Cardiac Catheterization Operative Report     Serene Cr  [id number]  12/17/2017     Patient was referred for cardiac catheterization       Indications for the procedure include: Symptoms suggestive of angina with high suspicion for significant obstructive coronary artery disease   Non ST elevation myocardial infarction         Procedure performed  Left heart cath  Coronary angiography  Right femoral arteriography  Insertion of 6 Fr Mynx hemostatic closure device with effective hemostasis and preserved right lower extremity pulses  Left ventriculography  Supervision of the administration of moderate sedation  PTCA of left circumflex coronary artery in the mid aspect at site of stent thrombosis.  Aspiration thrombectomy.     Anticoagulation heparin 30 mg intravenously and 1 mg/kg subcutaneously given the less than 8 hours ago.        Procedure Details  The risks, benefits, complications, treatment options, and expected outcomes were discussed with the patient. The patient and/or family concurred with the proposed plan, giving informed consent. Patient was brought to the cath lab after IV hydration was begun and oral premedication was given. He was further sedated with fentanyl and midazolam.The patient was prepped and draped in the usual manner. Using the modified Seldinger access technique, a 6f Belarusian sheath was placed in the femoral artery.        A left heart catheterization was performed.     Angiograms were also obtained.  Cardiac Catheterization Operative Report        Patient was referred for cardiac catheterization . Indications for the procedure include: abnormal stress test, chest pain, shortness of breath.      Procedure Details  The risks, benefits, complications, treatment options, and expected outcomes were discussed with the  patient. The patient and/or family concurred with the proposed plan, giving informed consent. Patient was brought to the cath lab after IV hydration was begun and oral premedication was given.      The skin overlying the patient's right femoral artery was prepped and draped in the usual sterile fashion.  Timeout was taken to confirm the correct patient and procedure.  Lidocaine was administered for local anesthesia.  IV Versed and fentanyl were used to achieve conscious sedation.  Modified Seldinger technique was then used to place a 7 Liberian sheath in the left femoral artery     Diagnostic coronary angiography was performed with 7 Liberian XB 3. 5 and 5 Liberian AR-1 coronary catheters 4 left circumflex coronary intervention and right coronary angiography respectively..  Coronary angiogram were performed in EVIN and HENDRIX projection to evaluate the coronary arterial systems.  A left heart catheterization was done.          Before all coronary angiograms and LV gram and Left heart pressure measurements were obtained, a femoral angiogram was performed and the arteriotomy was suitable for a closure device.  A 6Fr Mynx closure device was used to achieve hemostasis.  The patient tolerated the procedure well, and there were no immediate complications.     Procedural Details: After written and informed consent was obtained, the patient was brought to the cath lab in a fasting state.  Results:      Selective coronary angiography:     Left main coronary artery:  The left main coronary artery arises from the left coronary cusp and bifurcates into the  left anterior descending coronary artery  and left circumflex arteries.       Left main coronary artery is normal     Left anterior descending artery:  The  left anterior descending coronary artery   arises normally from the left main coronary artery and courses in the anterior interventricular groove and terminates at the apex.  Midportion has approximately a 50% stenosis.     Left  circumflex:  The left circumflex arises form the left man artery and supplies obtuse marginal branches.Left circumflex artery  is codominant and occluded in the midportion at the site of the earlier implanted stent diagnostic of acute stent thrombosis.  This was treated with the serial balloon angioplasty with establishment of EMMY 3 flow after aspiration thrombectomy.  Intracoronary nitroglycerin was given.     Right coronary artery:  The RCA arises normally from the right coronary cusp and is dominant for the posterior circulation.  The RCA is dominant and similar stenosis as on cardiac catheterization in the mid to distal aspect.  Nonselective injection was done the period to not easy to engage this vessel.         Left heart cath: LVEDP   26     mm Hg with no gradient across aortic valve on pullback.      LV Gram in Normal LVEF 55% with no significant mitral regurgitation.     Interventions: As described if any     Estimated Blood Loss:  Minimal      Complications:  None; patient tolerated the procedure well.      Disposition: Cardiovascular observation unit         Condition: stable               I supervised the administration of conscious sedation by nursing staff throughout the case.       First dose was given at          2112           and the end of my face-to-face encounter was at       2149            Hours.          During the case, continuous pulse oximetry, heart rate, blood pressure, and patient status were monitored.               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  The initial occlusion 100% postprocedure 0%.  Chest pain resolved completely after procedure.  Right coronary angiography nonselectively done and shows patent vessel likely with continued stenosis as she had the less than 10 days ago.     Normal LV EF     LVEDP   26    mm  Hg           Plan     Dual antiplatelet therapy minimum of 1 year.  The highly recommend discontinuing marijuana use and taking her Plavix regularly.Brilinta 180 mg today and 90 mg by mouth twice a day for 1 year.  Make sure she can afford afford this prior to discharge.  Intensive risk factor modifications for both primary and secondary prevention if applicable  Hydration   Observation  Check echocardiogram.                Radiation      Event Details User   9:49 PM Radiation Tracking Cumulative Air Kerma: Total Dose (mGy) = 882.000  Physician: Clinton Reddy MD  Dose (mGy) = 882.000  Fluoro Time (mins) = 7.0  DAP (Gy-cm2) = 77823.000 SO   Stent Inflated      Event Details User   No information to display   Balloon Inflated      Event Details User   9:30 PM Balloon Inflated Baln Trek Rx 2.5x15mm - First balloon inflation max pressure = 8 tracee. First balloon inflation duration = 25 seconds.  SO   9:31 PM Balloon Inflated Baln Trek Rx 2.5x15mm - Second inflation of balloon - Max pressure = 8 tracee. 2nd Inflation of balloon - Duration = 25 seconds.  SO   9:33 PM Balloon Inflated No Supply Selected - Third inflation of balloon - Max pressure = 8 tracee. 3rd Inflation of balloon - Duration = 25 seconds.  SO   9:34 PM Balloon Inflated No Supply Selected - Fourth inflation of balloon - Max pressure = 8 tracee. 4th Inflation of balloon - Duration = 20 seconds.  SO   Medications   As of 12/17/17 2201   (Filter:  CV CONTRAST GROUPER Medications Shown)   iopamidol (ISOVUE-370) 76 % injection (mL)   Total dose:  280 mL    Dose Action Route Admin Date/Time    Admin User   280 mL Given Intra-arterial 12/17/17 214  Clinton Reddy MD         Printable Result Report   Result Report    Encounter   View Encounter      PACS Images   Show images for Cardiac Catheterization/Vascular Study   Signed   Electronically signed by Clinton Reddy MD on 12/17/17 at 2206 CST   Encounter-Level Cardiology Documents:   There are no encounter-level cardiology  documents.    Link to Procedure Log   Procedure Log      Order-Level Documents:   Scan on 12/20/2017 2:04 AM : CARDIAC CATH HEMO DATA - SCANScan on 12/20/2017 2:04 AM : CARDIAC CATH HEMO DATA - SCAN   Results Routing Tracking   View Results Routing Information   Cardiac Catheterization/Vascular Study [CATH01] (Order [id number])   Cardiac Cath   Date: 12/17/2017 Department: [address] Released By: Hali Zayas RN Authorizing: Clinton Reddy MD   Order-Level Documents:   Scan on 12/20/2017 2:04 AM : CARDIAC CATH HEMO DATA - SCANScan on 12/20/2017 2:04 AM : CARDIAC CATH HEMO DATA - SCAN   Order History   Inpatient   Date/Time Action Taken User Additional Information   12/17/17 2047 Release Hali Zayas RN From Order: [id number]   12/17/17 2100 Result Hali Zayas RN In process   12/17/17 2159 Result Clinton Reddy MD Preliminary   12/17/17 2208 Result Hali Zayas RN Final   12/18/17 0702 Complete Darryl Marie    Order Details   Frequency Duration Priority Order Class   Once 1  occurrence Routine Hospital Performed   Start Date/Time   Start Date Start Time   12/17/17 2048   Procedures Performed   Code Procedure Name   CATH27 LEFT HEART CATH   CATH02 PERC CORONARY INTERVENTION   [id number] PERCUTANEOUS MECHANICAL THOMBECTOMY   Completion Info   User Date/Time   Darryl Marie 12/18/17 0702   Acknowledgement Info   For At Acknowledged By Acknowledged On   Placing Order 12/17/17 2047 Hali Zayas RN 12/17/17 2047   Placing Order 12/17/17 2047 Nanda Shipman RN 12/17/17 2216   Reprint Order Requisition   Cardiac Catheterization/Vascular Study (Order #[id number]) on 12/17/17   Encounter-Level Cardiology Documents:   There are no encounter-level cardiology documents.   Encounter   View Encounter   Appointments for this Order   12/17/2017  [facility]   Order Provider Info       Office phone Pager E-mail   Ordering User Hali Zayas RN -- -- --   Authorizing Provider Clinton Reddy [phone] -- --    Billing Provider Clinton Reddy [phone] -- --   Linked Charges   No charges linked to this procedure   Order Transmittal Tracking   Cardiac Catheterization/Vascular Study (Order #[id number]) on 12/17/17   Authorized by:  Clinton Reddy MD  (NPI: [id number])

## 2017-12-22 ENCOUNTER — TELEPHONE (OUTPATIENT)
Dept: VASCULAR SURGERY | Facility: CLINIC | Age: 47
End: 2017-12-22

## 2017-12-27 ENCOUNTER — DOCUMENTATION (OUTPATIENT)
Dept: CARDIOLOGY | Facility: CLINIC | Age: 47
End: 2017-12-27

## 2017-12-28 ENCOUNTER — TELEPHONE (OUTPATIENT)
Dept: VASCULAR SURGERY | Facility: CLINIC | Age: 47
End: 2017-12-28

## 2018-01-03 ENCOUNTER — TELEPHONE (OUTPATIENT)
Dept: CARDIOLOGY | Facility: CLINIC | Age: 48
End: 2018-01-03

## 2018-01-03 RX ORDER — PRASUGREL 10 MG/1
10 TABLET, FILM COATED ORAL DAILY
Qty: 90 TABLET | Refills: 3 | Status: SHIPPED | OUTPATIENT
Start: 2018-01-03 | End: 2018-01-04

## 2018-01-03 NOTE — TELEPHONE ENCOUNTER
SILVIO TRIED TO REACH PT SEVERAL TIMES TO NOTIFY HER ABOUT THE BRILINTA DENIAL & ALSO TO SEE HOW MANY SAMPLES SHE HAS LEFT. I CANNOT GET THROUGH TO PT & HAVE LEFT 2 MESSAGES TO RETURN MY CALL.    I TALKED TO DR BURGOS THIS MORNING ABOUT IT & HE SAID TO SWITCH HER TO EFFIENT OR PLAVIX.    PLEASE ADVISE

## 2018-01-03 NOTE — TELEPHONE ENCOUNTER
----- Message from Nona Fuentes PA-C sent at 1/3/2018 12:39 PM CST -----  Can you ask Dr. Reddy if he'd have me to appeal or if he just wants me to put her on plavix?     Thanks    ----- Message -----     From: Nadeen Patton MA     Sent: 1/2/2018   2:09 PM       To: MANUEL Harp-    PA WAS DENIED - I WILL CONTACT HER & GIVE HER SAMPLES. HOW DO YOU WANT TO PROCEED? WE CAN APPEAL IT, BUT THEY WILL WANT A STATEMENT/MEDICAL REASON FROM YOU AS TO WHY ITS PREFERRED.  JUST LET ME KNOW.    NADEEN        ----- Message -----     From: Nona Fuentes PA-C     Sent: 1/2/2018  12:45 PM       To: Nadeen Patton MA    Any word on PA of brilinta on this patient? Can you please call and see if she needs more samples in interim. If so please provider her some at office.   Thank you  Nona

## 2018-01-04 ENCOUNTER — TELEPHONE (OUTPATIENT)
Dept: CARDIOLOGY | Facility: CLINIC | Age: 48
End: 2018-01-04

## 2018-01-04 DIAGNOSIS — Z51.81 ENCOUNTER FOR MONITORING ANTIPLATELET THERAPY: Primary | ICD-10-CM

## 2018-01-04 DIAGNOSIS — Z79.02 ENCOUNTER FOR MONITORING ANTIPLATELET THERAPY: Primary | ICD-10-CM

## 2018-01-04 RX ORDER — CLOPIDOGREL BISULFATE 75 MG/1
75 TABLET ORAL DAILY
Qty: 90 TABLET | Refills: 3 | Status: SHIPPED | OUTPATIENT
Start: 2018-01-04

## 2018-01-04 NOTE — TELEPHONE ENCOUNTER
FINALLY GOT THROUGH TO PT - I LET HER KNOW THAT I HAVE BEEN TRYING TO REACH HER FOR THE PAST SEVERAL DAYS. SHE SHARES A CELL PHONE WITH HER SIGNIFICANT OTHER.  SHE STATES THAT WE ARE ALLOWED TO SPEAK WITH HIM IN THE FUTURE, BUT HE WILL NOT REMEMBER TO GIVE HER ANY MESSAGES. SHE STATES THAT IT IS OKAY TO A LEAVE A VOICEMAIL ON THE NUMBER LISTED IN HER CHART.    PT RAN OUT OF SAMPLES ON Monday SO SHE STARTED TAKING PLAVIX THAT SHE HAD AT HOME. SHE HAS BEEN ON PLAVIX FOR 3 DAYS NOW.    I EXPLAINED THE SITUATION WITH DIO & LET HER KNOW THAT EFFIENT WAS NOW AT PHARMACY. SHE WAS ADVISED TO PICK IT UP ASAP.    I ALSO EXPRESSED IMPORTANCE OF COMPLIANCE WITH ANTICOAGULANTS & ASKED THAT SHE UPDATE ME ON THE STATUS OF NEW RX OR IF SHE HAS ANY PROBLEMS GETTING IT.    NOTIFIED WALLACE AGUILERA VIA PHONE - WE WILL NOW JUST KEEP PT ON PLAVIX INSTEAD OF EFFIENT SINCE SHE HAS ALREADY STARTED TAKING IT. DELGADO WILL UPDATE PHARMACY & CHANGE EFFIENT SCRIPT TO PLAVIX.    TRIED TO REACH PT AGAIN TO NOTIFY HER OF THE CHANGE - LEFT A MESSAGE.

## 2018-01-04 NOTE — TELEPHONE ENCOUNTER
----- Message from Nona Fuentes PA-C sent at 1/4/2018 10:22 AM CST -----  I've cancelled her effient and brilinta through pharmacy. I have ordered her plavix. I've also ordered a PPI lab in 2 weeks to make sure she isn't resistant to plavix. If you get ahold of her please tell her to have this lab drawn in 2 weeks.  Thanks  Nona

## 2018-09-05 RX ORDER — NICOTINE 14MG/24HR
PATCH, TRANSDERMAL 24 HOURS TRANSDERMAL
Qty: 28 PATCH | Refills: 0 | OUTPATIENT
Start: 2018-09-05

## 2018-12-10 RX ORDER — NICOTINE 14MG/24HR
PATCH, TRANSDERMAL 24 HOURS TRANSDERMAL
Qty: 28 PATCH | Refills: 0 | OUTPATIENT
Start: 2018-12-10

## 2019-01-03 RX ORDER — ATORVASTATIN CALCIUM 80 MG/1
TABLET, FILM COATED ORAL
Qty: 30 TABLET | Refills: 2 | OUTPATIENT
Start: 2019-01-03

## 2019-01-03 RX ORDER — LISINOPRIL 20 MG/1
TABLET ORAL
Qty: 30 TABLET | Refills: 2 | OUTPATIENT
Start: 2019-01-03

## 2019-01-03 RX ORDER — CARVEDILOL 3.12 MG/1
TABLET ORAL
Qty: 60 TABLET | Refills: 2 | OUTPATIENT
Start: 2019-01-03

## 2022-05-23 ENCOUNTER — OFFICE VISIT (OUTPATIENT)
Dept: ENT CLINIC | Age: 52
End: 2022-05-23
Payer: MEDICAID

## 2022-05-23 ENCOUNTER — OFFICE VISIT (OUTPATIENT)
Age: 52
End: 2022-05-23
Payer: MEDICAID

## 2022-05-23 VITALS
WEIGHT: 268.4 LBS | HEART RATE: 71 BPM | SYSTOLIC BLOOD PRESSURE: 130 MMHG | OXYGEN SATURATION: 96 % | HEIGHT: 65 IN | DIASTOLIC BLOOD PRESSURE: 78 MMHG | BODY MASS INDEX: 44.72 KG/M2 | RESPIRATION RATE: 22 BRPM | TEMPERATURE: 97.7 F

## 2022-05-23 VITALS
WEIGHT: 268 LBS | HEIGHT: 65 IN | BODY MASS INDEX: 44.65 KG/M2 | DIASTOLIC BLOOD PRESSURE: 88 MMHG | SYSTOLIC BLOOD PRESSURE: 130 MMHG

## 2022-05-23 DIAGNOSIS — Z91.09 ENVIRONMENTAL ALLERGIES: ICD-10-CM

## 2022-05-23 DIAGNOSIS — S99.921A INJURY OF RIGHT FOOT, INITIAL ENCOUNTER: Primary | ICD-10-CM

## 2022-05-23 DIAGNOSIS — J32.0 CHRONIC SINUSITIS OF BOTH MAXILLARY SINUSES: Primary | ICD-10-CM

## 2022-05-23 PROCEDURE — 99213 OFFICE O/P EST LOW 20 MIN: CPT | Performed by: PHYSICIAN ASSISTANT

## 2022-05-23 PROCEDURE — G8427 DOCREV CUR MEDS BY ELIG CLIN: HCPCS | Performed by: PHYSICIAN ASSISTANT

## 2022-05-23 PROCEDURE — G8417 CALC BMI ABV UP PARAM F/U: HCPCS | Performed by: PHYSICIAN ASSISTANT

## 2022-05-23 PROCEDURE — 3017F COLORECTAL CA SCREEN DOC REV: CPT | Performed by: PHYSICIAN ASSISTANT

## 2022-05-23 PROCEDURE — G8419 CALC BMI OUT NRM PARAM NOF/U: HCPCS | Performed by: OTOLARYNGOLOGY

## 2022-05-23 PROCEDURE — 4004F PT TOBACCO SCREEN RCVD TLK: CPT | Performed by: OTOLARYNGOLOGY

## 2022-05-23 PROCEDURE — G8427 DOCREV CUR MEDS BY ELIG CLIN: HCPCS | Performed by: OTOLARYNGOLOGY

## 2022-05-23 PROCEDURE — 4004F PT TOBACCO SCREEN RCVD TLK: CPT | Performed by: PHYSICIAN ASSISTANT

## 2022-05-23 PROCEDURE — 99204 OFFICE O/P NEW MOD 45 MIN: CPT | Performed by: OTOLARYNGOLOGY

## 2022-05-23 PROCEDURE — 3017F COLORECTAL CA SCREEN DOC REV: CPT | Performed by: OTOLARYNGOLOGY

## 2022-05-23 RX ORDER — PREGABALIN 150 MG/1
CAPSULE ORAL
COMMUNITY

## 2022-05-23 RX ORDER — CYCLOBENZAPRINE HCL 10 MG
TABLET ORAL
COMMUNITY
Start: 2022-05-03

## 2022-05-23 RX ORDER — FEXOFENADINE HCL AND PSEUDOEPHEDRINE HCI 60; 120 MG/1; MG/1
TABLET, EXTENDED RELEASE ORAL
COMMUNITY

## 2022-05-23 RX ORDER — OMEPRAZOLE 20 MG/1
CAPSULE, DELAYED RELEASE ORAL
COMMUNITY
Start: 2022-05-03

## 2022-05-23 RX ORDER — DICLOFENAC SODIUM 75 MG/1
TABLET, DELAYED RELEASE ORAL
COMMUNITY

## 2022-05-23 RX ORDER — FLUCONAZOLE 150 MG/1
150 TABLET ORAL ONCE
Qty: 1 TABLET | Refills: 2 | Status: SHIPPED | OUTPATIENT
Start: 2022-05-23 | End: 2022-05-23

## 2022-05-23 RX ORDER — GLYCOPYRROLATE 1 MG/1
TABLET ORAL
COMMUNITY
Start: 2022-05-03

## 2022-05-23 RX ORDER — LISINOPRIL 20 MG/1
20 TABLET ORAL DAILY
COMMUNITY

## 2022-05-23 RX ORDER — FEXOFENADINE HCL AND PSEUDOEPHEDRINE HCI 180; 240 MG/1; MG/1
TABLET, EXTENDED RELEASE ORAL
COMMUNITY

## 2022-05-23 RX ORDER — AMOXICILLIN AND CLAVULANATE POTASSIUM 875; 125 MG/1; MG/1
1 TABLET, FILM COATED ORAL 2 TIMES DAILY
Qty: 28 TABLET | Refills: 0 | Status: SHIPPED | OUTPATIENT
Start: 2022-05-23 | End: 2022-06-06

## 2022-05-23 ASSESSMENT — ENCOUNTER SYMPTOMS
ALLERGIC/IMMUNOLOGIC NEGATIVE: 1
RESPIRATORY NEGATIVE: 1
SINUS PRESSURE: 1
ALLERGIC/IMMUNOLOGIC NEGATIVE: 1
SORE THROAT: 0
DIARRHEA: 0
GASTROINTESTINAL NEGATIVE: 1
ABDOMINAL PAIN: 0
VOMITING: 0
NAUSEA: 0
EYE PAIN: 0
SHORTNESS OF BREATH: 0
COUGH: 0
SINUS PAIN: 0
SINUS PRESSURE: 0
EYES NEGATIVE: 1

## 2022-05-23 NOTE — PROGRESS NOTES
[id number]  [address] [facility] [address] [address]  Dept: [phone]  Dept Fax: [phone]  Loc: [phone]    Dada Young is a 49 y.o. female who presents today for her medical conditions/complaints as noted below. Dada Young is complaining of Foot Injury (Right)    HPI:   Zohra states she fell the other day when she had her right foot up on the bathtub. She denies hitting or injuring any other body part. She appears hyperactive and her voice is hoarse she says from screaming about politics with her friend. She is walking on her feet and there doesn't appear to be any local swelling or deformity or bruising. Pt requesting percocet. Past Medical History:   Diagnosis Date    Anti-phospholipid syndrome ([facility] [facility]     Anxiety     Arthritis     Asthma     Dental disease     Depression     Diabetes mellitus ([facility] [facility]     Fibromyalgia     Headache     Hypertension     Lung disease     Seizures ([facility] [facility]     Sleep apnea        Past Surgical History:   Procedure Laterality Date    APPENDECTOMY      CAROTID ARTERY SURGERY Left        History reviewed. No pertinent family history.     Social History     Tobacco Use    Smoking status: Current Every Day Smoker     Start date: 27    Smokeless tobacco: Never Used   Substance Use Topics    Alcohol use: Not Currently        Current Outpatient Medications   Medication Sig Dispense Refill    pregabalin (LYRICA) 150 MG capsule lyrica 150 mg caps      omeprazole (PRILOSEC) 20 MG delayed release capsule       fexofenadine-pseudoephedrine (ALLEGRA-D 24HR) 180-240 MG per extended release tablet allegra-d 24 hour allergy & congestion 180-240 mg tb24   one daily      lisinopril (PRINIVIL;ZESTRIL) 20 MG tablet Take 20 mg by mouth daily      diclofenac (VOLTAREN) 75 MG EC tablet diclofenac sodium 75 mg tablet,delayed release      cyclobenzaprine (FLEXERIL) 10 MG tablet       glycopyrrolate (ROBINUL) 1 MG tablet       amoxicillin-clavulanate (AUGMENTIN) 875-125 MG per tablet Take 1 tablet by mouth 2 times daily for 14 days 28 tablet 0    fluconazole (DIFLUCAN) 150 MG tablet Take 1 tablet by mouth once for 1 dose 1 tablet 2    neomycin-polymyxin-hydrocortisone (CORTISPORIN) 3.5-22250-6 otic solution Place 4 drops into the left ear 3 times daily for 10 days Instill into left Ear 10 mL 0    fexofenadine-pseudoephedrine (ALLEGRA-D ALLERGY & CONGESTION)  MG per extended release tablet Allegra-D 12 Hour 60 mg-120 mg tablet,extended release   Take 1 tablet twice a day by oral route. (Patient not taking: Reported on 5/23/2022)       No current facility-administered medications for this visit. Allergies   Allergen Reactions    Hydrocodone     Sulfa Antibiotics        Health Maintenance   Topic Date Due    Depression Screen  Never done    HIV screen  Never done    Hepatitis C screen  Never done    DTaP/Tdap/Td vaccine (1 - Tdap) Never done    Cervical cancer screen  Never done    Diabetes screen  Never done    Lipids  Never done    Colorectal Cancer Screen  Never done    Breast cancer screen  Never done    Shingles vaccine (1 of 2) Never done    COVID-19 Vaccine (3 - Booster for Pfizer series) 09/01/2021    Flu vaccine (Season Ended) 09/01/2022    Hepatitis A vaccine  Aged Out    Hepatitis B vaccine  Aged Out    Hib vaccine  Aged Out    Meningococcal (ACWY) vaccine  Aged Out    Pneumococcal 0-64 years Vaccine  Aged Out       Subjective:   Review of Systems   Constitutional: Negative for chills, fatigue and fever. HENT: Negative for congestion, postnasal drip, sinus pressure, sinus pain and sore throat. Eyes: Negative for pain and visual disturbance. Respiratory: Negative for cough and shortness of breath. Cardiovascular: Negative for chest pain. Gastrointestinal: Negative for abdominal pain, diarrhea, nausea and vomiting.    Endocrine: Negative for cold intolerance and heat intolerance. Genitourinary: Negative for frequency, hematuria and urgency. Musculoskeletal: Positive for arthralgias and myalgias. Skin: Negative for rash. Allergic/Immunologic: Negative. Neurological: Negative for syncope, weakness, light-headedness and headaches. Hematological: Negative. Psychiatric/Behavioral: Negative. Objective    Physical Exam  Vitals and nursing note reviewed. Constitutional:       General: She is not in acute distress. Appearance: Normal appearance. She is obese. HENT:      Head: Normocephalic and atraumatic. Right Ear: External ear normal.      Left Ear: External ear normal.      Nose: Nose normal.      Mouth/Throat:      Mouth: Mucous membranes are moist.      Pharynx: Oropharynx is clear. Eyes:      Extraocular Movements: Extraocular movements intact. Conjunctiva/sclera: Conjunctivae normal.   Cardiovascular:      Rate and Rhythm: Normal rate and regular rhythm. Pulses: Normal pulses. Heart sounds: Normal heart sounds. Pulmonary:      Effort: Pulmonary effort is normal.      Breath sounds: No wheezing. Abdominal:      General: Abdomen is flat. Bowel sounds are normal. There is no distension. Palpations: Abdomen is soft. Tenderness: There is no abdominal tenderness. Musculoskeletal:         General: Tenderness present. Normal range of motion. Cervical back: Normal range of motion and neck supple. No tenderness. Comments: Pt tender to light touch on top lateral portion of right foot. Skin:     General: Skin is warm and dry. Findings: No erythema. Comments: Stasis dermatitis noted bilaterally as well as significant varicosities. Neurological:      General: No focal deficit present. Mental Status: She is alert and oriented to person, place, and time.    Psychiatric:         Mood and Affect: Mood normal.         Behavior: Behavior normal.         /78   Pulse 71   Temp 97.7 °F (36.5 °C) Resp 22   Ht 5' 5\" (1.651 m)   Wt 268 lb 6.4 oz (121.7 kg)   SpO2 96%   BMI 44.66 kg/m²     Assessment         Diagnosis Orders   1. Injury of right foot, initial encounter         Plan   1. Rest, Ice, compression, elevation. Alternate warm and cool compress. Warm soaks with epsom salt. 2. Continue home pain medications as prescribed. 3. Please follow up with PCP or return to clinic if symptoms worsen or fail to improve. No orders of the defined types were placed in this encounter. No results found for this visit on 05/23/22. No orders of the defined types were placed in this encounter. New Prescriptions    No medications on file        Return if symptoms worsen or fail to improve. Discussed use, benefits, and side effects of any prescribed medications. All patient questions were answered. Patient voiced understanding of care plan. Patient was given educational materials - see patient instructions below. Patient Instructions       Patient Education        Contusion: Care Instructions  Overview     Contusion is the medical term for a bruise. It is the result of a direct blowor an impact, such as a fall. Contusions are common sports injuries. Most people think of a bruise as a black-and-blue spot. This happens when small blood vessels get torn and leak blood under the skin. But bones, muscles, and organs can also get bruised. This may damage deep tissues but not cause abruise you can see. The doctor will do a physical exam to find the location of your contusion. You may also have tests to make sure you do not have a more serious injury, such as a broken bone or nerve damage. These may include X-rays or other imaging testslike a CT scan or MRI. Deep-tissue contusions may cause pain and swelling. But if there is no seriousdamage, they will often get better in a few weeks with home treatment. The doctor has checked you carefully, but problems can develop later.  If you notice any problems or new symptoms, get medical treatment right away. Follow-up care is a key part of your treatment and safety. Be sure to make and go to all appointments, and call your doctor if you are having problems. It's also a good idea to know your test results and keep alist of the medicines you take. How can you care for yourself at home?  Put ice or a cold pack on the sore area for 10 to 20 minutes at a time to stop swelling. Put a thin cloth between the ice pack and your skin.  Be safe with medicines. Read and follow all instructions on the label. ? If the doctor gave you a prescription medicine for pain, take it as prescribed. ? If you are not taking a prescription pain medicine, ask your doctor if you can take an over-the-counter medicine.  If you can, prop up the sore area on pillows as much as possible for the next few days. Try to keep the sore area above the level of your heart. When should you call for help? Call your doctor now or seek immediate medical care if:     Your pain gets worse.      You have new or worse swelling.      You have tingling, weakness, or numbness in the area near the contusion.      The area near the contusion is cold or pale. Watch closely for changes in your health, and be sure to contact your doctor if:     You do not get better as expected. Where can you learn more? Go to [email or web address]://[email or web address].[email or web address]. org and sign in to your [employer] account. Enter Q125 in the [facility] box to learn more about \"Contusion: Care Instructions. \"     If you do not have an account, please click on the \"Sign Up Now\" link. Current as of: July 1, 2021               Content Version: 13.2  © 7015-3977 [employer]. Care instructions adapted under license by [facility] ([facility]).  If you have questions about a medical condition or this instruction, always ask your healthcare professional. Romain Castro any warranty or liability for your use of this information. 1. Rest, Ice, compression, elevation. Alternate warm and cool compress. Warm soaks with epsom salt. 2. Continue home pain medications as prescribed. 3. Please follow up with PCP or return to clinic if symptoms worsen or fail to improve.           Electronically signed by Jeremie Farr PA-C on 5/23/2022 at 6:12 PM

## 2022-05-23 NOTE — PROGRESS NOTES
2022    Zohra Lee (:  1970) is a 49 y.o. female, Established patient, here for evaluation of the following chief complaint(s):  New Patient (chronic sinusitis, unspecified)      Vitals:    22 1601   BP: 130/88   Weight: 268 lb (121.6 kg)   Height: 5' 5\" (1.651 m)       Wt Readings from Last 3 Encounters:   22 268 lb (121.6 kg)       BP Readings from Last 3 Encounters:   22 130/88         SUBJECTIVE/OBJECTIVE:    New patient seen today for sinusitis. The patient states that she is chronically congested left worse than right and blowing out green drainage. She also says she occasionally blows out mucous plugs. She was ordered Allegra-D but her insurance company would not cover the [address] and she is taking Allegra. She suffers from allergies as well. Smokes half a pack a day. She is very hoarse today but says this is due to excessive talking with a friend about politics. Review of Systems   Constitutional: Negative. HENT: Positive for congestion, postnasal drip and sinus pressure. Eyes: Negative. Respiratory: Negative. Cardiovascular: Negative. Gastrointestinal: Negative. Endocrine: Negative. Musculoskeletal: Negative. Skin: Negative. Allergic/Immunologic: Negative. Neurological: Negative. Hematological: Negative. Psychiatric/Behavioral: Negative. Physical Exam  Vitals reviewed. Constitutional:       Appearance: Normal appearance. She is normal weight. HENT:      Head: Normocephalic and atraumatic. Right Ear: Tympanic membrane, ear canal and external ear normal.      Left Ear: Tympanic membrane, ear canal and external ear normal.      Nose: Nose normal.      Mouth/Throat:      Mouth: Mucous membranes are moist.      Pharynx: Oropharynx is clear. Eyes:      Extraocular Movements: Extraocular movements intact. Pupils: Pupils are equal, round, and reactive to light.    Cardiovascular:      Rate and Rhythm: Normal rate and regular rhythm. Pulmonary:      Effort: Pulmonary effort is normal.      Breath sounds: Normal breath sounds. Musculoskeletal:      Cervical back: Normal range of motion. Skin:     General: Skin is warm and dry. Neurological:      General: No focal deficit present. Mental Status: She is alert and oriented to person, place, and time. Psychiatric:         Mood and Affect: Mood normal.         Behavior: Behavior normal.              ASSESSMENT/PLAN:    1. Chronic sinusitis of both maxillary sinuses  2. Environmental allergies  I will start with Augmentin twice a day for 14 days. Would like to see her back after that and she is still hoarse I will plan on scoping at that time. Return in about 2 weeks (around 6/6/2022) for Sinus infection. An electronic signature was used to authenticate this note. Edel Robbins MD       Please note that this chart was generated using dragon dictation software. Although every effort was made to ensure the accuracy of this automated transcription, some errors in transcription may have occurred.

## 2022-05-23 NOTE — PATIENT INSTRUCTIONS
Patient Education        Contusion: Care Instructions  Overview     Contusion is the medical term for a bruise. It is the result of a direct blowor an impact, such as a fall. Contusions are common sports injuries. Most people think of a bruise as a black-and-blue spot. This happens when small blood vessels get torn and leak blood under the skin. But bones, muscles, and organs can also get bruised. This may damage deep tissues but not cause abruise you can see. The doctor will do a physical exam to find the location of your contusion. You may also have tests to make sure you do not have a more serious injury, such as a broken bone or nerve damage. These may include X-rays or other imaging testslike a CT scan or MRI. Deep-tissue contusions may cause pain and swelling. But if there is no seriousdamage, they will often get better in a few weeks with home treatment. The doctor has checked you carefully, but problems can develop later. If you notice any problems or new symptoms, get medical treatment right away. Follow-up care is a key part of your treatment and safety. Be sure to make and go to all appointments, and call your doctor if you are having problems. It's also a good idea to know your test results and keep alist of the medicines you take. How can you care for yourself at home?  Put ice or a cold pack on the sore area for 10 to 20 minutes at a time to stop swelling. Put a thin cloth between the ice pack and your skin.  Be safe with medicines. Read and follow all instructions on the label. ? If the doctor gave you a prescription medicine for pain, take it as prescribed. ? If you are not taking a prescription pain medicine, ask your doctor if you can take an over-the-counter medicine.  If you can, prop up the sore area on pillows as much as possible for the next few days. Try to keep the sore area above the level of your heart. When should you call for help?    Call your doctor now or seek immediate medical care if:     Your pain gets worse.      You have new or worse swelling.      You have tingling, weakness, or numbness in the area near the contusion.      The area near the contusion is cold or pale. Watch closely for changes in your health, and be sure to contact your doctor if:     You do not get better as expected. Where can you learn more? Go to [email or web address] and sign in to your [employer] account. Enter W657 in the WiOffer box to learn more about \"Contusion: Care Instructions. \"     If you do not have an account, please click on the \"Sign Up Now\" link. Current as of: July 1, 2021               Content Version: 13.2  © 2006-2022 [employer]. Care instructions adapted under license by [facility] ([facility]). If you have questions about a medical condition or this instruction, always ask your healthcare professional. [place] [address] any warranty or liability for your use of this information. 1. Rest, Ice, compression, elevation. Alternate warm and cool compress. Warm soaks with epsom salt. 2. Continue home pain medications as prescribed. 3. Please follow up with PCP or return to clinic if symptoms worsen or fail to improve.

## 2022-06-03 ENCOUNTER — TELEPHONE (OUTPATIENT)
Dept: ENT CLINIC | Age: 52
End: 2022-06-03

## 2022-06-03 NOTE — TELEPHONE ENCOUNTER
Patient advised the last time she was in the office 05.23.22 with  she mentioned having some lab work done to determine if she has a syndrome wrong with her.   Not finding information in office visit note  Please contact patient back to advise  Thank you

## 2022-06-03 NOTE — TELEPHONE ENCOUNTER
Spoke with Zohra regarding the message she left. I told her that Dr. Yeni Garza is out of the office so I will talk to him on Monday to see if he would like to order any labs prior to her appointment on Wednesday.

## 2022-06-06 ENCOUNTER — TELEPHONE (OUTPATIENT)
Dept: ENT CLINIC | Age: 52
End: 2022-06-06

## 2022-06-06 DIAGNOSIS — K11.7 XEROSTOMIA: Primary | ICD-10-CM

## 2022-06-06 NOTE — TELEPHONE ENCOUNTER
Left a message for Zohra that the labs she requested were ordered. Her appointment for 6/8 will be canceled. I asked her to call back to reschedule her appointment for 2 weeks from now to review her lab results.

## 2022-06-08 ENCOUNTER — TELEPHONE (OUTPATIENT)
Dept: ENT CLINIC | Age: 52
End: 2022-06-08

## 2022-06-08 NOTE — TELEPHONE ENCOUNTER
Spoke with patient's neighbor listed on her communication release form that patient is to get labs drawn and call the office to reschedule her appointment for 2 weeks from now. She stated she would let Zohra know.

## 2022-06-14 DIAGNOSIS — K11.7 XEROSTOMIA: ICD-10-CM

## 2022-06-17 LAB
ENA TO SSB (LA) ANTIBODY: 0 AU/ML (ref 0–40)
SSA 52 (RO) (ENA) AB, IGG: 1 AU/ML (ref 0–40)

## 2022-07-25 ENCOUNTER — OFFICE VISIT (OUTPATIENT)
Dept: ENT CLINIC | Age: 52
End: 2022-07-25
Payer: MEDICAID

## 2022-07-25 VITALS
HEIGHT: 65 IN | SYSTOLIC BLOOD PRESSURE: 138 MMHG | BODY MASS INDEX: 45.98 KG/M2 | DIASTOLIC BLOOD PRESSURE: 86 MMHG | WEIGHT: 276 LBS

## 2022-07-25 DIAGNOSIS — J40 BRONCHITIS: ICD-10-CM

## 2022-07-25 DIAGNOSIS — L30.9 CHRONIC DERMATITIS OF HANDS: ICD-10-CM

## 2022-07-25 DIAGNOSIS — Z91.09 ENVIRONMENTAL ALLERGIES: Primary | ICD-10-CM

## 2022-07-25 DIAGNOSIS — J38.1 REINKE'S EDEMA OF VOCAL FOLDS: ICD-10-CM

## 2022-07-25 DIAGNOSIS — R43.0 ANOSMIA: ICD-10-CM

## 2022-07-25 PROCEDURE — 4004F PT TOBACCO SCREEN RCVD TLK: CPT | Performed by: OTOLARYNGOLOGY

## 2022-07-25 PROCEDURE — G8417 CALC BMI ABV UP PARAM F/U: HCPCS | Performed by: OTOLARYNGOLOGY

## 2022-07-25 PROCEDURE — 3017F COLORECTAL CA SCREEN DOC REV: CPT | Performed by: OTOLARYNGOLOGY

## 2022-07-25 PROCEDURE — 99214 OFFICE O/P EST MOD 30 MIN: CPT | Performed by: OTOLARYNGOLOGY

## 2022-07-25 PROCEDURE — 31575 DIAGNOSTIC LARYNGOSCOPY: CPT | Performed by: OTOLARYNGOLOGY

## 2022-07-25 PROCEDURE — G8427 DOCREV CUR MEDS BY ELIG CLIN: HCPCS | Performed by: OTOLARYNGOLOGY

## 2022-07-25 PROCEDURE — 31231 NASAL ENDOSCOPY DX: CPT | Performed by: OTOLARYNGOLOGY

## 2022-07-25 RX ORDER — TRIAMCINOLONE ACETONIDE 0.25 MG/G
OINTMENT TOPICAL
Qty: 15 G | Refills: 1 | Status: SHIPPED | OUTPATIENT
Start: 2022-07-25 | End: 2022-08-11

## 2022-07-25 RX ORDER — MONTELUKAST SODIUM 10 MG/1
10 TABLET ORAL NIGHTLY
Qty: 45 TABLET | Refills: 3 | Status: SHIPPED | OUTPATIENT
Start: 2022-07-25

## 2022-07-25 ASSESSMENT — ENCOUNTER SYMPTOMS
WHEEZING: 1
VOICE CHANGE: 1
COUGH: 1
SHORTNESS OF BREATH: 1
EYES NEGATIVE: 1
ALLERGIC/IMMUNOLOGIC NEGATIVE: 1
GASTROINTESTINAL NEGATIVE: 1

## 2022-07-25 NOTE — PROGRESS NOTES
2022    Zohra Kimbrough (:  1970) is a 49 y.o. female, Established patient, here for evaluation of the following chief complaint(s):  Follow-up (Sinus infection)      Vitals:    22 1509   BP: 138/86   Weight: 276 lb (125.2 kg)   Height: 5' 5\" (1.651 m)       Wt Readings from Last 3 Encounters:   22 276 lb (125.2 kg)   22 268 lb 6.4 oz (121.7 kg)   22 268 lb (121.6 kg)       BP Readings from Last 3 Encounters:   22 138/86   22 130/78   22 130/88         SUBJECTIVE/OBJECTIVE:    Seen today for sinusitis and bronchitis. At the last today placed her on antibiotics she said it did help. She is no longer blowing out green drainage or having mucous plugs. She still suffers from a productive cough. She also has occasional sore throat. She also complains of anosmia. This going on for years. She is a smoker with hoarseness. Review of Systems   Constitutional: Negative. HENT:  Positive for voice change. Eyes: Negative. Respiratory:  Positive for cough, shortness of breath and wheezing. Cardiovascular: Negative. Gastrointestinal: Negative. Endocrine: Negative. Musculoskeletal: Negative. Skin: Negative. Allergic/Immunologic: Negative. Neurological: Negative. Hematological: Negative. Psychiatric/Behavioral: Negative. Physical Exam  Vitals reviewed. Constitutional:       Appearance: Normal appearance. She is normal weight. HENT:      Head: Normocephalic and atraumatic. Right Ear: Tympanic membrane, ear canal and external ear normal.      Left Ear: Tympanic membrane, ear canal and external ear normal.      Nose: Nose normal.      Mouth/Throat:      Mouth: Mucous membranes are moist.      Pharynx: Oropharynx is clear. Eyes:      Extraocular Movements: Extraocular movements intact. Pupils: Pupils are equal, round, and reactive to light. Cardiovascular:      Rate and Rhythm: Normal rate and regular rhythm. Pulmonary:      Effort: Pulmonary effort is normal.      Breath sounds: Normal breath sounds. Musculoskeletal:      Cervical back: Normal range of motion. Skin:     General: Skin is warm and dry. Neurological:      General: No focal deficit present. Mental Status: She is alert and oriented to person, place, and time. Psychiatric:         Mood and Affect: Mood normal.         Behavior: Behavior normal.      Procedure Note:    Flexible Laryngoscopy      Procedure : Flexible Laryngoscopy  Surgeon: Ban Lopez MD  Anesthesia: Afrin with 4% lidocaine  Indication: Laryngeal mirror examination was not tolerated due to gag reflex  Description:  After verbal consent was obtained, the scope was passed along the floor of the left naris to the level of the larynx. There was no evidence of concerning masses or lesions of the base of tongue, vallecula, epiglottis, aryepiglottic folds, arytenoids, false vocal folds, true vocal folds, or pyriform sinuses. True vocal folds exhibited symmetric motion bilaterally without evidence of paralysis or paresis. The scope was removed. The patient tolerated the procedure without difficulty. There were no complications. Pertinent findings:mild reinkes edema     Nasal Endoscopy  After verbal consent was obtained the flexible laryngoscope was used to examine both nasal cavities. No polyps noted. Clear ostiomeatal complexes bilaterally    ASSESSMENT/PLAN:    1. Environmental allergies  2. Bronchitis  3. Jared's edema of vocal folds  4. Anosmia  5. Chronic dermatitis of hands    Montelukast for her allergies in her chest.  Triamcinolone for her hands. Scope showed mild Ranke's edema which cessation of smoking would resolve. No obstruction leading to anosmia. Return in about 4 weeks (around 8/22/2022). An electronic signature was used to authenticate this note. Ban Lopez MD       Please note that this chart was generated using dragon dictation software. Although every effort was made to ensure the accuracy of this automated transcription, some errors in transcription may have occurred.

## 2022-08-11 RX ORDER — TRIAMCINOLONE ACETONIDE 0.25 MG/G
OINTMENT TOPICAL
Qty: 15 G | Refills: 1 | Status: SHIPPED | OUTPATIENT
Start: 2022-08-11

## 2022-12-12 RX ORDER — FLUCONAZOLE 150 MG/1
TABLET ORAL
Qty: 1 TABLET | Refills: 2 | Status: SHIPPED | OUTPATIENT
Start: 2022-12-12

## 2022-12-12 RX ORDER — MONTELUKAST SODIUM 10 MG/1
10 TABLET ORAL NIGHTLY
Qty: 30 TABLET | Refills: 3 | Status: SHIPPED | OUTPATIENT
Start: 2022-12-12

## 2023-05-24 RX ORDER — MONTELUKAST SODIUM 10 MG/1
10 TABLET ORAL NIGHTLY
Qty: 30 TABLET | Refills: 3 | Status: SHIPPED | OUTPATIENT
Start: 2023-05-24

## 2023-09-25 RX ORDER — MONTELUKAST SODIUM 10 MG/1
10 TABLET ORAL NIGHTLY
Qty: 30 TABLET | Refills: 3 | Status: SHIPPED | OUTPATIENT
Start: 2023-09-25

## 2024-01-10 RX ORDER — MONTELUKAST SODIUM 10 MG/1
10 TABLET ORAL NIGHTLY
Qty: 30 TABLET | Refills: 3 | Status: SHIPPED | OUTPATIENT
Start: 2024-01-10

## 2024-09-19 ENCOUNTER — OFFICE VISIT (OUTPATIENT)
Dept: FAMILY MEDICINE CLINIC | Age: 54
End: 2024-09-19

## 2024-09-19 VITALS
SYSTOLIC BLOOD PRESSURE: 138 MMHG | OXYGEN SATURATION: 97 % | BODY MASS INDEX: 40.65 KG/M2 | WEIGHT: 244 LBS | DIASTOLIC BLOOD PRESSURE: 76 MMHG | TEMPERATURE: 97.2 F | HEART RATE: 71 BPM | HEIGHT: 65 IN

## 2024-09-19 DIAGNOSIS — F41.9 ANXIETY AND DEPRESSION: ICD-10-CM

## 2024-09-19 DIAGNOSIS — M62.838 MUSCLE SPASM: ICD-10-CM

## 2024-09-19 DIAGNOSIS — D68.61 ANTIPHOSPHOLIPID ANTIBODY SYNDROME (HCC): ICD-10-CM

## 2024-09-19 DIAGNOSIS — E61.1 IRON DEFICIENCY: ICD-10-CM

## 2024-09-19 DIAGNOSIS — I10 PRIMARY HYPERTENSION: ICD-10-CM

## 2024-09-19 DIAGNOSIS — R60.9 SWELLING: ICD-10-CM

## 2024-09-19 DIAGNOSIS — M19.90 ARTHRITIS: ICD-10-CM

## 2024-09-19 DIAGNOSIS — K21.9 GASTROESOPHAGEAL REFLUX DISEASE, UNSPECIFIED WHETHER ESOPHAGITIS PRESENT: ICD-10-CM

## 2024-09-19 DIAGNOSIS — M79.7 FIBROMYALGIA: ICD-10-CM

## 2024-09-19 DIAGNOSIS — E11.9 TYPE 2 DIABETES MELLITUS WITHOUT COMPLICATION, WITH LONG-TERM CURRENT USE OF INSULIN (HCC): ICD-10-CM

## 2024-09-19 DIAGNOSIS — Z79.4 TYPE 2 DIABETES MELLITUS WITHOUT COMPLICATION, WITH LONG-TERM CURRENT USE OF INSULIN (HCC): ICD-10-CM

## 2024-09-19 DIAGNOSIS — D68.61 ANTIPHOSPHOLIPID ANTIBODY SYNDROME (HCC): Primary | ICD-10-CM

## 2024-09-19 DIAGNOSIS — E78.5 HYPERLIPIDEMIA, UNSPECIFIED HYPERLIPIDEMIA TYPE: ICD-10-CM

## 2024-09-19 DIAGNOSIS — F41.9 ANXIETY: ICD-10-CM

## 2024-09-19 DIAGNOSIS — F32.A ANXIETY AND DEPRESSION: ICD-10-CM

## 2024-09-19 LAB
ALBUMIN SERPL-MCNC: 4.2 G/DL (ref 3.5–5.2)
ALP SERPL-CCNC: 118 U/L (ref 35–104)
ALT SERPL-CCNC: 15 U/L (ref 5–33)
ANION GAP SERPL CALCULATED.3IONS-SCNC: 14 MMOL/L (ref 7–19)
AST SERPL-CCNC: 31 U/L (ref 5–32)
BASOPHILS # BLD: 0.1 K/UL (ref 0–0.2)
BASOPHILS NFR BLD: 0.7 % (ref 0–1)
BILIRUB SERPL-MCNC: 0.5 MG/DL (ref 0.2–1.2)
BUN SERPL-MCNC: 15 MG/DL (ref 6–20)
CALCIUM SERPL-MCNC: 9.7 MG/DL (ref 8.6–10)
CHLORIDE SERPL-SCNC: 106 MMOL/L (ref 98–111)
CHOLEST SERPL-MCNC: 210 MG/DL (ref 0–199)
CO2 SERPL-SCNC: 21 MMOL/L (ref 22–29)
CREAT SERPL-MCNC: 0.8 MG/DL (ref 0.5–0.9)
EOSINOPHIL # BLD: 0.2 K/UL (ref 0–0.6)
EOSINOPHIL NFR BLD: 1.7 % (ref 0–5)
ERYTHROCYTE [DISTWIDTH] IN BLOOD BY AUTOMATED COUNT: 14 % (ref 11.5–14.5)
GLUCOSE SERPL-MCNC: 140 MG/DL (ref 70–99)
HBA1C MFR BLD: 6.3 % (ref 4–5.6)
HCT VFR BLD AUTO: 46.6 % (ref 37–47)
HDLC SERPL-MCNC: 42 MG/DL (ref 40–60)
HGB BLD-MCNC: 14.2 G/DL (ref 12–16)
IMM GRANULOCYTES # BLD: 0 K/UL
IRON SATN MFR SERPL: 34 % (ref 14–50)
IRON SERPL-MCNC: 110 UG/DL (ref 37–145)
LDLC SERPL CALC-MCNC: 115 MG/DL
LYMPHOCYTES # BLD: 3.9 K/UL (ref 1.1–4.5)
LYMPHOCYTES NFR BLD: 36 % (ref 20–40)
MAGNESIUM SERPL-MCNC: 1.1 MG/DL (ref 1.6–2.6)
MCH RBC QN AUTO: 28.7 PG (ref 27–31)
MCHC RBC AUTO-ENTMCNC: 30.5 G/DL (ref 33–37)
MCV RBC AUTO: 94.3 FL (ref 81–99)
MONOCYTES # BLD: 0.7 K/UL (ref 0–0.9)
MONOCYTES NFR BLD: 6.6 % (ref 0–10)
NEUTROPHILS # BLD: 6 K/UL (ref 1.5–7.5)
NEUTS SEG NFR BLD: 54.9 % (ref 50–65)
PLATELET # BLD AUTO: 665 K/UL (ref 130–400)
PMV BLD AUTO: 10.2 FL (ref 9.4–12.3)
POTASSIUM SERPL-SCNC: 4.5 MMOL/L (ref 3.5–5)
PROT SERPL-MCNC: 7.8 G/DL (ref 6.4–8.3)
RBC # BLD AUTO: 4.94 M/UL (ref 4.2–5.4)
SODIUM SERPL-SCNC: 141 MMOL/L (ref 136–145)
TIBC SERPL-MCNC: 328 UG/DL (ref 250–400)
TRIGL SERPL-MCNC: 267 MG/DL (ref 0–149)
TSH SERPL DL<=0.005 MIU/L-ACNC: 0.45 UIU/ML (ref 0.27–4.2)
WBC # BLD AUTO: 10.8 K/UL (ref 4.8–10.8)

## 2024-09-19 RX ORDER — CARVEDILOL 3.12 MG/1
1 TABLET ORAL 2 TIMES DAILY
COMMUNITY
End: 2024-09-19 | Stop reason: SDUPTHER

## 2024-09-19 RX ORDER — IPRATROPIUM BROMIDE AND ALBUTEROL SULFATE 2.5; .5 MG/3ML; MG/3ML
SOLUTION RESPIRATORY (INHALATION)
COMMUNITY

## 2024-09-19 RX ORDER — MAGNESIUM OXIDE 400 MG/1
400 TABLET ORAL DAILY
Qty: 30 TABLET | Refills: 5 | Status: SHIPPED | OUTPATIENT
Start: 2024-09-19

## 2024-09-19 RX ORDER — BUMETANIDE 1 MG/1
1 TABLET ORAL DAILY
Qty: 30 TABLET | Refills: 5 | Status: SHIPPED | OUTPATIENT
Start: 2024-09-19

## 2024-09-19 RX ORDER — MAGNESIUM OXIDE 400 MG/1
400 TABLET ORAL DAILY
COMMUNITY
End: 2024-09-19 | Stop reason: SDUPTHER

## 2024-09-19 RX ORDER — FEXOFENADINE HCL 180 MG/1
1 TABLET ORAL DAILY
COMMUNITY
Start: 2024-05-10

## 2024-09-19 RX ORDER — ALPRAZOLAM 1 MG
1 TABLET ORAL 2 TIMES DAILY PRN
COMMUNITY
End: 2024-09-19 | Stop reason: SDUPTHER

## 2024-09-19 RX ORDER — METFORMIN HYDROCHLORIDE 500 MG/1
500 TABLET, FILM COATED, EXTENDED RELEASE ORAL
COMMUNITY
End: 2024-09-19

## 2024-09-19 RX ORDER — ALPRAZOLAM 1 MG
1 TABLET ORAL 2 TIMES DAILY PRN
Qty: 60 TABLET | Refills: 2 | Status: SHIPPED | OUTPATIENT
Start: 2024-09-19 | End: 2025-09-19

## 2024-09-19 RX ORDER — CYCLOBENZAPRINE HCL 10 MG
10 TABLET ORAL 3 TIMES DAILY PRN
Qty: 90 TABLET | Refills: 5 | Status: SHIPPED | OUTPATIENT
Start: 2024-09-19

## 2024-09-19 RX ORDER — DICLOFENAC SODIUM 75 MG/1
75 TABLET, DELAYED RELEASE ORAL 2 TIMES DAILY
Qty: 60 TABLET | Refills: 5 | Status: SHIPPED | OUTPATIENT
Start: 2024-09-19

## 2024-09-19 RX ORDER — BUMETANIDE 1 MG/1
1 TABLET ORAL
COMMUNITY
End: 2024-09-19 | Stop reason: SDUPTHER

## 2024-09-19 RX ORDER — ATORVASTATIN CALCIUM 80 MG/1
80 TABLET, FILM COATED ORAL DAILY
Qty: 30 TABLET | Refills: 5 | Status: SHIPPED | OUTPATIENT
Start: 2024-09-19

## 2024-09-19 RX ORDER — PAROXETINE 20 MG/1
1 TABLET, FILM COATED ORAL DAILY
COMMUNITY
End: 2024-09-19 | Stop reason: SDUPTHER

## 2024-09-19 RX ORDER — FERROUS SULFATE 325(65) MG
325 TABLET ORAL
Qty: 30 TABLET | Refills: 5 | Status: SHIPPED | OUTPATIENT
Start: 2024-09-19

## 2024-09-19 RX ORDER — FERROUS SULFATE 325(65) MG
325 TABLET ORAL
COMMUNITY
End: 2024-09-19 | Stop reason: SDUPTHER

## 2024-09-19 RX ORDER — AMLODIPINE BESYLATE 2.5 MG/1
2.5 TABLET ORAL DAILY
Qty: 30 TABLET | Refills: 5 | Status: SHIPPED | OUTPATIENT
Start: 2024-09-19

## 2024-09-19 RX ORDER — AMLODIPINE BESYLATE 2.5 MG/1
1 TABLET ORAL DAILY
COMMUNITY
End: 2024-09-19 | Stop reason: SDUPTHER

## 2024-09-19 RX ORDER — LISINOPRIL 20 MG/1
20 TABLET ORAL DAILY
Qty: 30 TABLET | Refills: 5 | Status: SHIPPED | OUTPATIENT
Start: 2024-09-19

## 2024-09-19 RX ORDER — ASPIRIN 81 MG/1
81 TABLET, CHEWABLE ORAL DAILY
COMMUNITY

## 2024-09-19 RX ORDER — CLOPIDOGREL BISULFATE 75 MG/1
75 TABLET ORAL DAILY
Qty: 30 TABLET | Refills: 5 | Status: SHIPPED | OUTPATIENT
Start: 2024-09-19

## 2024-09-19 RX ORDER — PAROXETINE 20 MG/1
20 TABLET, FILM COATED ORAL DAILY
Qty: 30 TABLET | Refills: 5 | Status: SHIPPED | OUTPATIENT
Start: 2024-09-19

## 2024-09-19 RX ORDER — PREGABALIN 150 MG/1
150 CAPSULE ORAL 3 TIMES DAILY
Qty: 90 CAPSULE | Refills: 2 | Status: SHIPPED | OUTPATIENT
Start: 2024-09-19 | End: 2025-09-19

## 2024-09-19 RX ORDER — ATORVASTATIN CALCIUM 80 MG/1
80 TABLET, FILM COATED ORAL DAILY
COMMUNITY
End: 2024-09-19 | Stop reason: SDUPTHER

## 2024-09-19 RX ORDER — CARVEDILOL 3.12 MG/1
3.12 TABLET ORAL 2 TIMES DAILY
Qty: 60 TABLET | Refills: 5 | Status: SHIPPED | OUTPATIENT
Start: 2024-09-19

## 2024-09-19 RX ORDER — METFORMIN HCL 500 MG
1000 TABLET, EXTENDED RELEASE 24 HR ORAL
Qty: 60 TABLET | Refills: 5 | Status: SHIPPED | OUTPATIENT
Start: 2024-09-19

## 2024-09-19 RX ORDER — CLOPIDOGREL BISULFATE 75 MG/1
1 TABLET ORAL DAILY
COMMUNITY
End: 2024-09-19 | Stop reason: SDUPTHER

## 2024-09-19 SDOH — ECONOMIC STABILITY: INCOME INSECURITY: HOW HARD IS IT FOR YOU TO PAY FOR THE VERY BASICS LIKE FOOD, HOUSING, MEDICAL CARE, AND HEATING?: NOT VERY HARD

## 2024-09-19 SDOH — ECONOMIC STABILITY: FOOD INSECURITY: WITHIN THE PAST 12 MONTHS, THE FOOD YOU BOUGHT JUST DIDN'T LAST AND YOU DIDN'T HAVE MONEY TO GET MORE.: NEVER TRUE

## 2024-09-19 SDOH — ECONOMIC STABILITY: FOOD INSECURITY: WITHIN THE PAST 12 MONTHS, YOU WORRIED THAT YOUR FOOD WOULD RUN OUT BEFORE YOU GOT MONEY TO BUY MORE.: NEVER TRUE

## 2024-09-19 ASSESSMENT — PATIENT HEALTH QUESTIONNAIRE - PHQ9
1. LITTLE INTEREST OR PLEASURE IN DOING THINGS: NOT AT ALL
SUM OF ALL RESPONSES TO PHQ QUESTIONS 1-9: 0
SUM OF ALL RESPONSES TO PHQ QUESTIONS 1-9: 0
SUM OF ALL RESPONSES TO PHQ9 QUESTIONS 1 & 2: 0
SUM OF ALL RESPONSES TO PHQ QUESTIONS 1-9: 0
2. FEELING DOWN, DEPRESSED OR HOPELESS: NOT AT ALL
SUM OF ALL RESPONSES TO PHQ QUESTIONS 1-9: 0

## 2024-09-21 LAB
CARDIOLIPIN IGG SER IA-ACNC: <10 GPL
CARDIOLIPIN IGM SER IA-ACNC: 97 MPL

## 2024-09-22 LAB
B2 GLYCOPROT1 IGG SERPL IA-ACNC: <10 SGU
B2 GLYCOPROT1 IGM SERPL IA-ACNC: 102 SMU

## 2024-09-25 LAB
APTT SCREEN TO CONFIRM RATIO: 1.08 {RATIO}
CONFIRM DRVVT STA-STACLOT: NORMAL S
DRVVT SCREEN TO CONFIRM RATIO: 1.2 {RATIO}
DRVVT SCREEN TO CONFIRM RATIO: NORMAL {RATIO}
DRVVT/DRVVT CFM P DOAC NEUT NORM PPP-RTO: NORMAL {RATIO}
HEPARIN NT PPP QL: NORMAL
LA 3 SCREEN W REFLEX-IMP: NORMAL
LMW HEPARIN IND PLT AB SER QL: NORMAL
MIXING DRVVT: NORMAL S
PROTHROMBIN TIME: 13.2 S (ref 12–15.5)
SCREEN APTT: NORMAL S
THROMBIN TIME: NORMAL S

## 2024-09-29 PROBLEM — M79.7 FIBROMYALGIA: Status: ACTIVE | Noted: 2024-09-29

## 2024-09-29 PROBLEM — F41.9 ANXIETY AND DEPRESSION: Status: ACTIVE | Noted: 2024-09-29

## 2024-09-29 PROBLEM — F32.A ANXIETY AND DEPRESSION: Status: ACTIVE | Noted: 2024-09-29

## 2024-09-29 PROBLEM — I10 PRIMARY HYPERTENSION: Status: ACTIVE | Noted: 2024-09-29

## 2024-09-29 PROBLEM — D68.61 ANTIPHOSPHOLIPID ANTIBODY SYNDROME (HCC): Status: ACTIVE | Noted: 2024-09-29

## 2024-09-29 PROBLEM — E11.9 TYPE 2 DIABETES MELLITUS WITHOUT COMPLICATION, WITH LONG-TERM CURRENT USE OF INSULIN (HCC): Status: ACTIVE | Noted: 2024-09-29

## 2024-09-29 PROBLEM — K21.9 GASTROESOPHAGEAL REFLUX DISEASE: Status: ACTIVE | Noted: 2024-09-29

## 2024-09-29 PROBLEM — Z79.4 TYPE 2 DIABETES MELLITUS WITHOUT COMPLICATION, WITH LONG-TERM CURRENT USE OF INSULIN (HCC): Status: ACTIVE | Noted: 2024-09-29

## 2024-09-29 PROBLEM — E78.5 HYPERLIPIDEMIA: Status: ACTIVE | Noted: 2024-09-29

## 2024-09-30 ENCOUNTER — TELEPHONE (OUTPATIENT)
Dept: FAMILY MEDICINE CLINIC | Age: 54
End: 2024-09-30

## 2024-10-17 RX ORDER — GLYCOPYRROLATE 1 MG/1
1 TABLET ORAL 2 TIMES DAILY
Qty: 60 TABLET | Refills: 1 | Status: SHIPPED | OUTPATIENT
Start: 2024-10-17

## 2024-10-17 RX ORDER — SENNOSIDES A AND B 8.6 MG/1
2 TABLET, FILM COATED ORAL DAILY
Qty: 60 TABLET | Refills: 1 | Status: SHIPPED | OUTPATIENT
Start: 2024-10-17 | End: 2024-12-16

## 2024-10-17 RX ORDER — NEOMYCIN SULFATE, POLYMYXIN B SULFATE, HYDROCORTISONE 3.5; 10000; 1 MG/ML; [USP'U]/ML; MG/ML
3 SOLUTION/ DROPS AURICULAR (OTIC) 3 TIMES DAILY
Qty: 10 ML | Refills: 0 | Status: SHIPPED | OUTPATIENT
Start: 2024-10-17

## 2024-10-17 RX ORDER — FEXOFENADINE HCL AND PSEUDOEPHEDRINE HCL 180; 240 MG/1; MG/1
1 TABLET, EXTENDED RELEASE ORAL DAILY
Qty: 30 TABLET | Refills: 1 | Status: SHIPPED | OUTPATIENT
Start: 2024-10-17

## 2024-10-17 RX ORDER — FLUCONAZOLE 150 MG/1
150 TABLET ORAL DAILY
Qty: 3 TABLET | Refills: 1 | Status: SHIPPED | OUTPATIENT
Start: 2024-10-17

## 2024-10-28 RX ORDER — FEXOFENADINE HYDROCHLORIDE 180 MG/1
180 TABLET ORAL DAILY
Qty: 30 TABLET | Refills: 3 | Status: SHIPPED | OUTPATIENT
Start: 2024-10-28

## 2024-10-28 RX ORDER — ASPIRIN 81 MG
2 TABLET, DELAYED RELEASE (ENTERIC COATED) ORAL DAILY
Qty: 60 TABLET | Refills: 0 | Status: SHIPPED | OUTPATIENT
Start: 2024-10-28

## 2024-11-18 DIAGNOSIS — F41.9 ANXIETY: ICD-10-CM

## 2024-11-18 DIAGNOSIS — M79.7 FIBROMYALGIA: ICD-10-CM

## 2024-11-18 RX ORDER — GLYCOPYRROLATE 1 MG/1
1 TABLET ORAL 2 TIMES DAILY
Qty: 60 TABLET | Refills: 1 | Status: SHIPPED | OUTPATIENT
Start: 2024-11-18

## 2024-11-18 RX ORDER — ALPRAZOLAM 1 MG/1
1 TABLET ORAL 2 TIMES DAILY PRN
Qty: 60 TABLET | Refills: 2 | OUTPATIENT
Start: 2024-11-18

## 2024-11-18 RX ORDER — FLUCONAZOLE 150 MG/1
150 TABLET ORAL DAILY
Qty: 3 TABLET | Refills: 1 | Status: SHIPPED | OUTPATIENT
Start: 2024-11-18

## 2024-11-18 RX ORDER — PREGABALIN 150 MG/1
150 CAPSULE ORAL 3 TIMES DAILY
Qty: 90 CAPSULE | Refills: 2 | OUTPATIENT
Start: 2024-11-18

## 2025-01-13 DIAGNOSIS — F41.9 ANXIETY: ICD-10-CM

## 2025-01-13 DIAGNOSIS — M79.7 FIBROMYALGIA: ICD-10-CM

## 2025-01-13 RX ORDER — ALPRAZOLAM 1 MG/1
1 TABLET ORAL 2 TIMES DAILY PRN
Qty: 60 TABLET | Refills: 2 | OUTPATIENT
Start: 2025-01-13

## 2025-01-13 RX ORDER — PREGABALIN 150 MG/1
150 CAPSULE ORAL 3 TIMES DAILY
Qty: 90 CAPSULE | Refills: 2 | OUTPATIENT
Start: 2025-01-13

## 2025-03-03 RX ORDER — TRIAMCINOLONE ACETONIDE 5 MG/G
CREAM TOPICAL
Qty: 15 G | Refills: 0 | Status: SHIPPED | OUTPATIENT
Start: 2025-03-03

## 2025-04-14 DIAGNOSIS — K21.9 GASTROESOPHAGEAL REFLUX DISEASE, UNSPECIFIED WHETHER ESOPHAGITIS PRESENT: ICD-10-CM

## 2025-04-14 DIAGNOSIS — I10 PRIMARY HYPERTENSION: ICD-10-CM

## 2025-04-14 DIAGNOSIS — F32.A ANXIETY AND DEPRESSION: ICD-10-CM

## 2025-04-14 DIAGNOSIS — Z79.4 TYPE 2 DIABETES MELLITUS WITHOUT COMPLICATION, WITH LONG-TERM CURRENT USE OF INSULIN: ICD-10-CM

## 2025-04-14 DIAGNOSIS — D68.61 ANTIPHOSPHOLIPID ANTIBODY SYNDROME: ICD-10-CM

## 2025-04-14 DIAGNOSIS — R60.9 SWELLING: ICD-10-CM

## 2025-04-14 DIAGNOSIS — E78.5 HYPERLIPIDEMIA, UNSPECIFIED HYPERLIPIDEMIA TYPE: ICD-10-CM

## 2025-04-14 DIAGNOSIS — E11.9 TYPE 2 DIABETES MELLITUS WITHOUT COMPLICATION, WITH LONG-TERM CURRENT USE OF INSULIN: ICD-10-CM

## 2025-04-14 DIAGNOSIS — F41.9 ANXIETY AND DEPRESSION: ICD-10-CM

## 2025-04-14 DIAGNOSIS — M62.838 MUSCLE SPASM: ICD-10-CM

## 2025-04-14 DIAGNOSIS — E61.1 IRON DEFICIENCY: ICD-10-CM

## 2025-04-14 RX ORDER — AMLODIPINE BESYLATE 2.5 MG/1
2.5 TABLET ORAL DAILY
Qty: 30 TABLET | Refills: 5 | Status: SHIPPED | OUTPATIENT
Start: 2025-04-14

## 2025-04-14 RX ORDER — MAGNESIUM OXIDE 400 MG/1
400 TABLET ORAL DAILY
Qty: 30 TABLET | Refills: 5 | Status: SHIPPED | OUTPATIENT
Start: 2025-04-14

## 2025-04-14 RX ORDER — METFORMIN HYDROCHLORIDE 500 MG/1
1000 TABLET, EXTENDED RELEASE ORAL
Qty: 60 TABLET | Refills: 5 | Status: SHIPPED | OUTPATIENT
Start: 2025-04-14

## 2025-04-14 RX ORDER — FERROUS SULFATE 325(65) MG
1 TABLET ORAL
Qty: 30 TABLET | Refills: 5 | Status: SHIPPED | OUTPATIENT
Start: 2025-04-14

## 2025-04-14 RX ORDER — OMEPRAZOLE 20 MG/1
20 CAPSULE, DELAYED RELEASE ORAL DAILY
Qty: 30 CAPSULE | Refills: 5 | Status: SHIPPED | OUTPATIENT
Start: 2025-04-14

## 2025-04-14 RX ORDER — LISINOPRIL 20 MG/1
20 TABLET ORAL DAILY
Qty: 30 TABLET | Refills: 5 | Status: SHIPPED | OUTPATIENT
Start: 2025-04-14

## 2025-04-14 RX ORDER — CLOPIDOGREL BISULFATE 75 MG/1
75 TABLET ORAL DAILY
Qty: 30 TABLET | Refills: 5 | Status: SHIPPED | OUTPATIENT
Start: 2025-04-14

## 2025-04-14 RX ORDER — CARVEDILOL 3.12 MG/1
3.12 TABLET ORAL 2 TIMES DAILY
Qty: 60 TABLET | Refills: 5 | Status: SHIPPED | OUTPATIENT
Start: 2025-04-14

## 2025-04-14 RX ORDER — CYCLOBENZAPRINE HCL 10 MG
10 TABLET ORAL 3 TIMES DAILY PRN
Qty: 90 TABLET | Refills: 5 | Status: SHIPPED | OUTPATIENT
Start: 2025-04-14

## 2025-04-14 RX ORDER — BUMETANIDE 1 MG/1
1 TABLET ORAL DAILY
Qty: 30 TABLET | Refills: 5 | Status: SHIPPED | OUTPATIENT
Start: 2025-04-14

## 2025-04-14 RX ORDER — ATORVASTATIN CALCIUM 80 MG/1
80 TABLET, FILM COATED ORAL DAILY
Qty: 30 TABLET | Refills: 5 | Status: SHIPPED | OUTPATIENT
Start: 2025-04-14

## 2025-04-14 RX ORDER — FLUCONAZOLE 150 MG/1
150 TABLET ORAL DAILY
Qty: 3 TABLET | Refills: 1 | Status: SHIPPED | OUTPATIENT
Start: 2025-04-14

## 2025-04-14 RX ORDER — PAROXETINE 20 MG/1
20 TABLET, FILM COATED ORAL DAILY
Qty: 30 TABLET | Refills: 5 | Status: SHIPPED | OUTPATIENT
Start: 2025-04-14

## 2025-06-02 DIAGNOSIS — M19.90 ARTHRITIS: ICD-10-CM

## 2025-06-04 RX ORDER — TRIAMCINOLONE ACETONIDE 5 MG/G
CREAM TOPICAL
Qty: 15 G | Refills: 0 | Status: SHIPPED | OUTPATIENT
Start: 2025-06-04

## 2025-06-04 RX ORDER — DICLOFENAC SODIUM 75 MG/1
75 TABLET, DELAYED RELEASE ORAL 2 TIMES DAILY
Qty: 60 TABLET | Refills: 5 | Status: SHIPPED | OUTPATIENT
Start: 2025-06-04

## 2025-06-04 RX ORDER — DOCUSATE SODIUM, SENNOSIDES 50; 8.6 MG/1; MG/1
2 TABLET ORAL DAILY
Qty: 60 TABLET | Refills: 0 | Status: SHIPPED | OUTPATIENT
Start: 2025-06-04

## 2025-06-04 RX ORDER — GLYCOPYRROLATE 1 MG/1
1 TABLET ORAL 2 TIMES DAILY
Qty: 60 TABLET | Refills: 1 | Status: SHIPPED | OUTPATIENT
Start: 2025-06-04

## 2025-07-07 RX ORDER — FLUCONAZOLE 150 MG/1
150 TABLET ORAL DAILY
Qty: 3 TABLET | Refills: 1 | Status: SHIPPED | OUTPATIENT
Start: 2025-07-07

## 2025-07-07 NOTE — TELEPHONE ENCOUNTER
Last OV- 9/19/2024    Requested Prescriptions     Pending Prescriptions Disp Refills    fluconazole (DIFLUCAN) 150 MG tablet [Pharmacy Med Name: FLUCONAZOLE 150 MG TABS 150 Tablet] 3 tablet 1     Sig: TAKE 1 TABLET BY MOUTH DAILY

## 2025-07-11 NOTE — TELEPHONE ENCOUNTER
Last OV 9/19/2024  Next OV Visit date not found      Requested Prescriptions     Pending Prescriptions Disp Refills    triamcinolone (ARISTOCORT) 0.5 % cream [Pharmacy Med Name: TRIAMCINOLONE 0.5% CREAM 0.5 Cream] 15 g 0     Sig: APPLY A THIN LAYER TO AFFECTED AREA BY TOPICAL ROUTE TWICE A DAY

## 2025-07-15 RX ORDER — TRIAMCINOLONE ACETONIDE 5 MG/G
CREAM TOPICAL
Qty: 15 G | Refills: 0 | Status: SHIPPED | OUTPATIENT
Start: 2025-07-15

## 2025-07-16 RX ORDER — TRIAMCINOLONE ACETONIDE 5 MG/G
CREAM TOPICAL
Qty: 15 G | Refills: 0 | OUTPATIENT
Start: 2025-07-16

## 2025-08-05 RX ORDER — GLYCOPYRROLATE 1 MG/1
1 TABLET ORAL 2 TIMES DAILY
Qty: 60 TABLET | Refills: 1 | Status: SHIPPED | OUTPATIENT
Start: 2025-08-05

## (undated) DEVICE — GW STARTER FXD CORE J .035 3X150CM 3MM

## (undated) DEVICE — DEV TORQ GW HOT/PINK

## (undated) DEVICE — SOLIDIFIER LIQUI LOC PLUS 2000CC

## (undated) DEVICE — PINNACLE INTRODUCER SHEATH: Brand: PINNACLE

## (undated) DEVICE — PK CATH CARD 30

## (undated) DEVICE — GW PRESSUREWIRE X WIRELESS FFR 175CM

## (undated) DEVICE — RUNTHROUGH NS EXTRA FLOPPY PTCA GUIDEWIRE: Brand: RUNTHROUGH

## (undated) DEVICE — FR5 INFINITI MULTIPAC: Brand: INFINITI

## (undated) DEVICE — MODEL AT P65, P/N 701554-001KIT CONTENTS: HAND CONTROLLER, 3-WAY HIGH-PRESSURE STOPCOCK WITH ROTATING END AND PREMIUM HIGH-PRESSURE TUBING: Brand: ANGIOTOUCH® KIT

## (undated) DEVICE — ST PRIM PUMP 20DRP 3VLV 127IN

## (undated) DEVICE — MODEL BT2000 P/N 700287-012KIT CONTENTS: MANIFOLD WITH SALINE AND CONTRAST PORTS, SALINE TUBING WITH SPIKE AND HAND SYRINGE, TRANSDUCER: Brand: BT2000 AUTOMATED MANIFOLD KIT

## (undated) DEVICE — GC 7F 078 XB 3.5: Brand: VISTA BRITE TIP

## (undated) DEVICE — CATH F5 INF 3DRC 100CM: Brand: INFINITI

## (undated) DEVICE — A2000 MULTI-USE SYRINGE KIT, P/N 701277-003KIT CONTENTS: 100ML CONTRAST RESERVOIR AND TUBING WITH CONTRAST SPIKE AND CLAMP: Brand: A2000 MULTI-USE SYRINGE KIT

## (undated) DEVICE — CATH F5 INF AR I MOD 100CM: Brand: INFINITI

## (undated) DEVICE — ELECTRD PAD DEFIB A/

## (undated) DEVICE — SOL IRR NACL 0.9PCT BT 1000ML

## (undated) DEVICE — CANN CO2/O2 NASL A/

## (undated) DEVICE — SOL NACL 0.9PCT 100ML SGL

## (undated) DEVICE — TOOL INSRT GW MTL OR PLSTC

## (undated) DEVICE — INFLATION DEVICE: Brand: ENCORE™ 26

## (undated) DEVICE — GW PTCA ASAHI PROWATER .014 180CM

## (undated) DEVICE — CATH F5 INF JR 4 100CM: Brand: INFINITI

## (undated) DEVICE — MYNXGRIP 6F/7F: Brand: MYNXGRIP

## (undated) DEVICE — 6F .070 AR 1 SH 100CM: Brand: VISTA BRITE TIP

## (undated) DEVICE — COPILOT BLEEDBACK CONTROL VALVE: Brand: COPILOT

## (undated) DEVICE — IMMOB KN 3PNL DLX CANVS 19IN BLU

## (undated) DEVICE — DRSNG PRESS SAFEGUARD

## (undated) DEVICE — SOL NACL INJ 0.9PCT 50ML

## (undated) DEVICE — 6F .070 3 DRC 100CM: Brand: VISTA BRITE TIP

## (undated) DEVICE — TREK CORONARY DILATATION CATHETER 2.50 MM X 15 MM / RAPID-EXCHANGE: Brand: TREK

## (undated) DEVICE — USE OF THE SMARTNEEDLE DEVICE IS INDICATED WHEN BLOOD FLOW MUST BE DETECTED FOR PERCUTANEOUS VESSEL CANNULATION. THE VESSEL MUST BE OF A CALIBER WHICH WOULD NORMALLY BE PUNCTURED WITH A NEEDLE AND/OR CATHETER OF THIS SIZE OR LARGER.: Brand: SMARTNEEDLE® VASCULAR ACCESS SYSTEM

## (undated) DEVICE — THE PRONTO LP CATHETER IS INDICATED FOR THE REMOVAL OF FRESH, SOFT EMBOLI AND THROMBI FROM VESSELS IN THE CORONARY AND PERIPHERAL SYSTEM.: Brand: PRONTO® LP EXTRACTION CATHETER